# Patient Record
Sex: MALE | Race: WHITE | NOT HISPANIC OR LATINO | Employment: FULL TIME | URBAN - METROPOLITAN AREA
[De-identification: names, ages, dates, MRNs, and addresses within clinical notes are randomized per-mention and may not be internally consistent; named-entity substitution may affect disease eponyms.]

---

## 2017-01-03 ENCOUNTER — ANESTHESIA EVENT (OUTPATIENT)
Dept: SURGERY | Facility: HOSPITAL | Age: 42
DRG: 505 | End: 2017-01-03
Payer: OTHER MISCELLANEOUS

## 2017-01-03 ENCOUNTER — TELEPHONE (OUTPATIENT)
Dept: ORTHOPEDICS | Facility: CLINIC | Age: 42
End: 2017-01-03

## 2017-01-03 NOTE — TELEPHONE ENCOUNTER
Spoke with Des with [employer]. Des stated that nurse  for patient's case is requesting a handheld shower, and bedside commode. Will discuss with Dr. Washburn and fax the prescriptions to her.

## 2017-01-03 NOTE — TELEPHONE ENCOUNTER
----- Message from Morelia Nevarez sent at 1/3/2017  2:46 PM CST -----  Contact: Des - [employer]  Des with [employer] called in regards to Kiel and some medications that he will be needing prior to his surgery tomorrow. Please call her back at [phone]

## 2017-01-04 ENCOUNTER — ANESTHESIA (OUTPATIENT)
Dept: SURGERY | Facility: HOSPITAL | Age: 42
DRG: 505 | End: 2017-01-04
Payer: OTHER MISCELLANEOUS

## 2017-01-04 ENCOUNTER — SURGERY (OUTPATIENT)
Age: 42
End: 2017-01-04

## 2017-01-04 PROBLEM — S92.002A BILATERAL CALCANEAL FRACTURES: Status: ACTIVE | Noted: 2017-01-04

## 2017-01-04 PROBLEM — S92.001A BILATERAL CALCANEAL FRACTURES: Status: ACTIVE | Noted: 2017-01-04

## 2017-01-04 PROCEDURE — 76942 ECHO GUIDE FOR BIOPSY: CPT | Mod: 26,,, | Performed by: ANESTHESIOLOGY

## 2017-01-04 PROCEDURE — 25000003 PHARM REV CODE 250: Performed by: ANESTHESIOLOGY

## 2017-01-04 PROCEDURE — 63600175 PHARM REV CODE 636 W HCPCS: Performed by: NURSE ANESTHETIST, CERTIFIED REGISTERED

## 2017-01-04 PROCEDURE — 27200651 HC AIRWAY, LMA: Performed by: NURSE ANESTHETIST, CERTIFIED REGISTERED

## 2017-01-04 PROCEDURE — D9220A PRA ANESTHESIA: Mod: ,,, | Performed by: ANESTHESIOLOGY

## 2017-01-04 PROCEDURE — 76942 ECHO GUIDE FOR BIOPSY: CPT | Performed by: ANESTHESIOLOGY

## 2017-01-04 PROCEDURE — 27200664 HC NERVE BLOCK COMPLETE KIT: Performed by: ANESTHESIOLOGY

## 2017-01-04 PROCEDURE — 64446 NJX AA&/STRD SC NRV NFS IMG: CPT | Performed by: ANESTHESIOLOGY

## 2017-01-04 PROCEDURE — 64450 NJX AA&/STRD OTHER PN/BRANCH: CPT | Mod: 59,LT,, | Performed by: ANESTHESIOLOGY

## 2017-01-04 PROCEDURE — 63600175 PHARM REV CODE 636 W HCPCS: Performed by: ORTHOPAEDIC SURGERY

## 2017-01-04 PROCEDURE — 25000003 PHARM REV CODE 250: Performed by: NURSE ANESTHETIST, CERTIFIED REGISTERED

## 2017-01-04 RX ORDER — HYDROMORPHONE HYDROCHLORIDE 2 MG/ML
INJECTION, SOLUTION INTRAMUSCULAR; INTRAVENOUS; SUBCUTANEOUS
Status: DISCONTINUED | OUTPATIENT
Start: 2017-01-04 | End: 2017-01-04

## 2017-01-04 RX ORDER — METOPROLOL TARTRATE 1 MG/ML
INJECTION, SOLUTION INTRAVENOUS
Status: DISCONTINUED | OUTPATIENT
Start: 2017-01-04 | End: 2017-01-04

## 2017-01-04 RX ORDER — ACETAMINOPHEN 10 MG/ML
INJECTION, SOLUTION INTRAVENOUS
Status: DISCONTINUED | OUTPATIENT
Start: 2017-01-04 | End: 2017-01-04

## 2017-01-04 RX ORDER — FENTANYL CITRATE 50 UG/ML
INJECTION, SOLUTION INTRAMUSCULAR; INTRAVENOUS
Status: DISCONTINUED | OUTPATIENT
Start: 2017-01-04 | End: 2017-01-04

## 2017-01-04 RX ORDER — KETAMINE HYDROCHLORIDE 100 MG/ML
INJECTION, SOLUTION INTRAMUSCULAR; INTRAVENOUS
Status: DISCONTINUED | OUTPATIENT
Start: 2017-01-04 | End: 2017-01-04

## 2017-01-04 RX ORDER — DEXAMETHASONE SODIUM PHOSPHATE 4 MG/ML
INJECTION, SOLUTION INTRA-ARTICULAR; INTRALESIONAL; INTRAMUSCULAR; INTRAVENOUS; SOFT TISSUE
Status: DISCONTINUED | OUTPATIENT
Start: 2017-01-04 | End: 2017-01-04

## 2017-01-04 RX ORDER — PROPOFOL 10 MG/ML
VIAL (ML) INTRAVENOUS
Status: DISCONTINUED | OUTPATIENT
Start: 2017-01-04 | End: 2017-01-04

## 2017-01-04 RX ORDER — LIDOCAINE HCL/PF 100 MG/5ML
SYRINGE (ML) INTRAVENOUS
Status: DISCONTINUED | OUTPATIENT
Start: 2017-01-04 | End: 2017-01-04

## 2017-01-04 RX ORDER — KETOROLAC TROMETHAMINE 30 MG/ML
INJECTION, SOLUTION INTRAMUSCULAR; INTRAVENOUS
Status: DISCONTINUED | OUTPATIENT
Start: 2017-01-04 | End: 2017-01-04

## 2017-01-04 RX ORDER — MIDAZOLAM HYDROCHLORIDE 1 MG/ML
INJECTION, SOLUTION INTRAMUSCULAR; INTRAVENOUS
Status: DISCONTINUED | OUTPATIENT
Start: 2017-01-04 | End: 2017-01-04

## 2017-01-04 RX ORDER — ONDANSETRON HYDROCHLORIDE 2 MG/ML
INJECTION, SOLUTION INTRAMUSCULAR; INTRAVENOUS
Status: DISCONTINUED | OUTPATIENT
Start: 2017-01-04 | End: 2017-01-04

## 2017-01-04 RX ADMIN — KETOROLAC TROMETHAMINE 30 MG: 30 INJECTION, SOLUTION INTRAMUSCULAR; INTRAVENOUS at 10:01

## 2017-01-04 RX ADMIN — SODIUM CHLORIDE, SODIUM LACTATE, POTASSIUM CHLORIDE, AND CALCIUM CHLORIDE: .6; .31; .03; .02 INJECTION, SOLUTION INTRAVENOUS at 11:01

## 2017-01-04 RX ADMIN — ONDANSETRON 4 MG: 2 INJECTION, SOLUTION INTRAMUSCULAR; INTRAVENOUS at 10:01

## 2017-01-04 RX ADMIN — CEFAZOLIN SODIUM 2 G: 2 SOLUTION INTRAVENOUS at 10:01

## 2017-01-04 RX ADMIN — FENTANYL CITRATE 50 MCG: 50 INJECTION INTRAMUSCULAR; INTRAVENOUS at 10:01

## 2017-01-04 RX ADMIN — MUPIROCIN 22 TUBE: 20 OINTMENT TOPICAL at 11:01

## 2017-01-04 RX ADMIN — HYDROMORPHONE HYDROCHLORIDE 1 MG: 2 INJECTION INTRAMUSCULAR; INTRAVENOUS; SUBCUTANEOUS at 10:01

## 2017-01-04 RX ADMIN — DEXAMETHASONE SODIUM PHOSPHATE 4 MG: 4 INJECTION, SOLUTION INTRAMUSCULAR; INTRAVENOUS at 10:01

## 2017-01-04 RX ADMIN — CEFAZOLIN SODIUM 1 G: 2 SOLUTION INTRAVENOUS at 12:01

## 2017-01-04 RX ADMIN — ACETAMINOPHEN 1000 MG: 10 INJECTION, SOLUTION INTRAVENOUS at 10:01

## 2017-01-04 RX ADMIN — SODIUM CHLORIDE, SODIUM LACTATE, POTASSIUM CHLORIDE, AND CALCIUM CHLORIDE: .6; .31; .03; .02 INJECTION, SOLUTION INTRAVENOUS at 02:01

## 2017-01-04 RX ADMIN — PROPOFOL 200 MG: 10 INJECTION, EMULSION INTRAVENOUS at 10:01

## 2017-01-04 RX ADMIN — LIDOCAINE HYDROCHLORIDE 100 MG: 20 INJECTION, SOLUTION INTRAVENOUS at 10:01

## 2017-01-04 RX ADMIN — HYDROMORPHONE HYDROCHLORIDE 0.5 MG: 2 INJECTION INTRAMUSCULAR; INTRAVENOUS; SUBCUTANEOUS at 12:01

## 2017-01-04 RX ADMIN — KETAMINE HYDROCHLORIDE 30 MG: 100 INJECTION, SOLUTION, CONCENTRATE INTRAMUSCULAR; INTRAVENOUS at 10:01

## 2017-01-04 RX ADMIN — METOPROLOL TARTRATE 2.5 MG: 1 INJECTION, SOLUTION INTRAVENOUS at 11:01

## 2017-01-04 RX ADMIN — MIDAZOLAM 2 MG: 1 INJECTION INTRAMUSCULAR; INTRAVENOUS at 10:01

## 2017-01-04 RX ADMIN — ESMOLOL HYDROCHLORIDE 30 MG: 20 INJECTION INTRAVENOUS at 10:01

## 2017-01-04 RX ADMIN — METOPROLOL TARTRATE 2.5 MG: 1 INJECTION, SOLUTION INTRAVENOUS at 12:01

## 2017-01-04 NOTE — ANESTHESIA PROCEDURE NOTES
Peripheral    Patient location during procedure: post-op   Block not for primary anesthetic.  Reason for block: at surgeon's request and post-op pain management   Post-op Pain Location: foot/calcaneous right  Start time: 1/4/2017 2:45 PM  Timeout: 1/4/2017 2:31 PM   End time: 1/4/2017 2:59 PM  Surgery related to: ORIF calcaneal fracture right  Staffing  Anesthesiologist: LARA VALADEZ  Performed by: anesthesiologist   Preanesthetic Checklist  Completed: patient identified, site marked, surgical consent, pre-op evaluation, timeout performed, IV checked, risks and benefits discussed and monitors and equipment checked  Peripheral Block  Patient position: supine  Prep: ChloraPrep and site prepped and draped  Patient monitoring: cardiac monitor, continuous pulse ox and frequent blood pressure checks  Block type: popliteal  Laterality: right  Injection technique: continuous  Needle  Needle type: Tuohy   Needle length: 4 in  Needle localization: ultrasound guidance  Catheter type: non-stimulating  Catheter size: 20 G  Test dose: lidocaine 1.5% with Epi 1-to-200,000 and negative   -ultrasound image captured on disc.  Assessment  Injection assessment: negative aspiration, negative parasthesia and local visualized surrounding nerve  Paresthesia pain: none  Heart rate change: no  Slow fractionated injection: yes  Medications:  Bolus administered: 20 mL of 0.5 and 2.0 ropivacaine and lidocaine  Epinephrine added: none  Additional Notes  Ropiv. 0.5% with equal volume lido 2%

## 2017-01-04 NOTE — ANESTHESIA PREPROCEDURE EVALUATION
01/04/2017  Kiel Ayala is a 41 y.o., male.    [facility] Anesthesia Evaluation    I have reviewed the Patient Summary Reports.    I have reviewed the Nursing Notes.      Review of Systems  Anesthesia Hx:  No problems with previous Anesthesia Denies Hx of Anesthetic complications    Social:  Non-Smoker    Hematology/Oncology:  Hematology Normal   Oncology Normal     EENT/Dental:EENT/Dental Normal   Cardiovascular:  Cardiovascular Normal     Pulmonary:  Pulmonary Normal    Renal/:  Renal/ Normal     Hepatic/GI:  Hepatic/GI Normal    Neurological:  Neurology Normal    Endocrine:  Endocrine Normal        Physical Exam  General:  Well nourished    Airway/Jaw/Neck:  Airway Findings: Mouth Opening: Normal Tongue: Normal  General Airway Assessment: Adult  Mallampati: II  Improves to I with phonation.  TM Distance: Normal, at least 6 cm  Jaw/Neck Findings:  Neck ROM: Normal ROM      Dental:  Dental Findings: In tact   Chest/Lungs:  Chest/Lungs Clear    Heart/Vascular:  Heart Findings: Normal            Anesthesia Plan  Type of Anesthesia, risks & benefits discussed:  Anesthesia Type:  general  Patient's Preference:   Intra-op Monitoring Plan:   Intra-op Monitoring Plan Comments:   Post Op Pain Control Plan:   Post Op Pain Control Plan Comments:   Induction:   IV  Beta Blocker:  Patient is not currently on a Beta-Blocker (No further documentation required).       Informed Consent: Patient understands risks and agrees with Anesthesia plan.  Questions answered. Anesthesia consent signed with patient.  ASA Score: 1     Day of Surgery Review of History & Physical:    H&P update referred to the surgeon.         Ready For Surgery From Anesthesia Perspective.

## 2017-01-04 NOTE — ANESTHESIA PROCEDURE NOTES
Peripheral    Patient location during procedure: post-op   Block not for primary anesthetic.  Reason for block: at surgeon's request and post-op pain management   Post-op Pain Location: foot/calcaneous left  Start time: 1/4/2017 2:31 PM  Timeout: 1/4/2017 2:31 PM   End time: 1/4/2017 2:44 PM  Surgery related to: ORIF calcaneal Fracture  Staffing  Anesthesiologist: LARA VALADEZ  Performed by: anesthesiologist   Preanesthetic Checklist  Completed: patient identified, site marked, surgical consent, pre-op evaluation, timeout performed, IV checked, risks and benefits discussed and monitors and equipment checked  Peripheral Block  Patient position: supine  Prep: ChloraPrep and site prepped and draped  Patient monitoring: heart rate, cardiac monitor, frequent blood pressure checks and continuous pulse ox  Block type: popliteal  Laterality: left  Injection technique: continuous  Needle  Needle type: Tuohy   Needle gauge: 18 G  Needle length: 4 in  Needle localization: ultrasound guidance  Catheter type: non-stimulating  Catheter size: 20 G  Test dose: lidocaine 1.5% with Epi 1-to-200,000 and negative   -ultrasound image captured on disc.  Assessment  Injection assessment: negative aspiration, negative parasthesia and local visualized surrounding nerve  Paresthesia pain: none  Heart rate change: no  Slow fractionated injection: yes  Medications:  Bolus administered: 20 mL of 0.5 and 2.0 ropivacaine and lidocaine  Epinephrine added: none

## 2017-01-04 NOTE — TRANSFER OF CARE
"Anesthesia Transfer of Care Note    Patient: Kiel GARCIA Rancatore    Procedure(s) Performed: Procedure(s):  OPEN REDUCTION INTERNAL FIXATION- CALCANEOUS (bilateral)    Patient location: PACU    Anesthesia Type: general    Transport from OR: Transported from OR on 2-3 L/min O2 by NC with adequate spontaneous ventilation    Post pain: adequate analgesia    Post assessment: no apparent anesthetic complications    Post vital signs: stable    Level of consciousness: awake and oriented    Nausea/Vomiting: no nausea/vomiting    Complications: none          Last vitals:   Visit Vitals    /71 (BP Location: Left arm, Patient Position: Lying, BP Method: Automatic)    Pulse 95    Temp 36.4 °C (97.6 °F) (Oral)    Resp 18    Ht 5' 5" (1.651 m)    Wt 72.6 kg (160 lb)    SpO2 100%    BMI 26.63 kg/m2     "

## 2017-01-04 NOTE — ANESTHESIA POSTPROCEDURE EVALUATION
"Anesthesia Post Evaluation    Patient: Kiel Dominguezcatore    Procedure(s) Performed: Procedure(s):  OPEN REDUCTION INTERNAL FIXATION- CALCANEOUS (bilateral)    Final Anesthesia Type: general  Patient location during evaluation: PACU  Patient participation: Yes- Able to Participate  Level of consciousness: awake and alert  Post-procedure vital signs: reviewed and stable  Pain management: adequate  Airway patency: patent  PONV status at discharge: No PONV  Anesthetic complications: no      Cardiovascular status: blood pressure returned to baseline  Respiratory status: unassisted  Hydration status: euvolemic  Follow-up not needed.        Visit Vitals    /82 (BP Location: Left arm, Patient Position: Lying, BP Method: Automatic)    Pulse 100    Temp 36.4 °C (97.6 °F) (Oral)    Resp 14    Ht 5' 5" (1.651 m)    Wt 72.6 kg (160 lb)    SpO2 98%    BMI 26.63 kg/m2       Pain/Salvador Score: Pain Assessment Performed: Yes (1/4/2017  3:15 PM)  Presence of Pain: complains of pain/discomfort (1/4/2017  3:15 PM)  Pain Rating Prior to Med Admin: 6 (1/4/2017  3:15 PM)  Salvador Score: 9 (1/4/2017  3:15 PM)      "

## 2017-01-05 ENCOUNTER — ANESTHESIA (OUTPATIENT)
Dept: MEDSURG UNIT | Facility: HOSPITAL | Age: 42
DRG: 505 | End: 2017-01-05
Payer: OTHER MISCELLANEOUS

## 2017-01-05 PROCEDURE — 27200750 HC INSULATED NEEDLE/ STIMUPLEX: Performed by: ANESTHESIOLOGY

## 2017-01-05 PROCEDURE — 64445 NJX AA&/STRD SCIATIC NRV IMG: CPT | Performed by: ANESTHESIOLOGY

## 2017-01-05 NOTE — ADDENDUM NOTE
Addendum  created 01/05/17 1119 by Adam Luna II, MD    Charge Capture section accepted, Sign clinical note

## 2017-01-06 RX ORDER — MEPERIDINE HYDROCHLORIDE 50 MG/1
50 TABLET ORAL EVERY 4 HOURS PRN
Qty: 83 TABLET | Refills: 0 | Status: SHIPPED | OUTPATIENT
Start: 2017-01-06 | End: 2017-11-02

## 2017-01-06 NOTE — ADDENDUM NOTE
Addendum  created 01/06/17 1619 by Tyler Ocampo MD    Anesthesia Attestations filed, Anesthesia Intra Blocks edited

## 2017-01-06 NOTE — TELEPHONE ENCOUNTER
Spoke with Micaela Magallon pt , she stated magalie does not have the demerol and it needs to be sent to another pharmacy.Informed her I will call another pharmacy to see who has script and will be in touch with her aagin, she verbalized understanding.

## 2017-01-06 NOTE — ADDENDUM NOTE
Addendum  created 01/06/17 1459 by Dinorah Platt MD    Anesthesia Event edited, Procedure Event Log accessed, Sign clinical note

## 2017-01-06 NOTE — ANESTHESIA PROCEDURE NOTES
Peripheral    Patient location during procedure: floor   Block not for primary anesthetic.  Reason for block: at surgeon's request and post-op pain management   Post-op Pain Location: BILATERAL CALCANEOUS FRACTURES  Start time: 1/5/2017 6:45 PM  Timeout: 1/5/2017 6:45 PM   End time: 1/5/2017 6:55 PM  Surgery related to: OPEN REDUCTION INTERNAL FIXATION- CALCANEOUS (bilateral) (Bilateral Foot  Staffing  Anesthesiologist: IBRAHIMA DAI  Performed by: anesthesiologist   Preanesthetic Checklist  Completed: patient identified, site marked, surgical consent, pre-op evaluation, timeout performed, IV checked, risks and benefits discussed and monitors and equipment checked  Peripheral Block  Patient position: supine  Prep: ChloraPrep  Patient monitoring: heart rate  Block type: popliteal  Laterality: right  Injection technique: single shot  Needle  Needle type: Stimuplex   Needle gauge: 21 G  Needle length: 4 in  Needle localization: paresthesias and ultrasound guidance  Needle insertion depth: 5 cm  Test dose: negative     Assessment  Injection assessment: negative aspiration, negative parasthesia and local visualized surrounding nerve  Paresthesia pain: none  Heart rate change: no  Slow fractionated injection: yes  Medications:  Bolus administered: 30 mL of 0.25 bupivacaine  Epinephrine added: 5 mcg/mL (1/200,000)

## 2017-01-06 NOTE — TELEPHONE ENCOUNTER
Spoke with pt  Micaela Magallon, informed her the [facility] in [place] [facility] the script and it will be cancelled at [facility] and resent to the medicine shop[facility] in [place]. She stated that was fine.

## 2017-01-07 ENCOUNTER — NURSE TRIAGE (OUTPATIENT)
Dept: ADMINISTRATIVE | Facility: CLINIC | Age: 42
End: 2017-01-07

## 2017-01-07 NOTE — TELEPHONE ENCOUNTER
"    Reason for Disposition   Sounds like a serious complication to the triager    Answer Assessment - Initial Assessment Questions  1. SYMPTOM: "What's the main symptom you're concerned about?" (e.g., pain, fever, vomiting)      Pain to both feet  2. ONSET: "When did ________  start?"      today  3. SURGERY: "What surgery was performed?"      Repair fracture to bilatteral heels  4. DATE of SURGERY: "When was surgery performed?"       1/2017  5. ANESTHESIA: " What type of anesthesia did you have?" (e.g., general, spinal, epidural, local)      general  6. PAIN: "Is there any pain?" If so, ask: "How bad is it?"  (Scale 1-10; or mild, moderate, severe)      10/10  7. FEVER: "Do you have a fever?" If so, ask: "What is your temperature, how was it measured, and when did it start?"      no  8. VOMITING: "Is there any vomiting?" If yes, ask: "How many times?"      no  9. BLEEDING: "Is there any bleeding?" If so, ask: "How much?" and "Where?"      no  10. OTHER SYMPTOMS: "Do you have any other symptoms?" (e.g., drainage from wound, painful urination, constipation)        Demerol not holding pain    Protocols used: ST POST-OP SYMPTOMS AND [id number]    Patient experiencing pain to bilatteral feet post-op bilatteral hell fracture repair.  Patient reported Demerol 50mg Q 4 hours is not working.  Referred patient to the ED for eval and treatment.    "

## 2017-01-09 ENCOUNTER — TELEPHONE (OUTPATIENT)
Dept: ORTHOPEDICS | Facility: CLINIC | Age: 42
End: 2017-01-09

## 2017-01-09 NOTE — TELEPHONE ENCOUNTER
----- Message from Stacey M Lefort sent at 1/9/2017  2:02 PM CST -----  Contact: Des [employer] - [phone]  Des would like a call back in regard to Mr. Ayala. He is in extreme pain and they are trying to avoid having him go to an er b/c of the type of injury that he has.  Mr. Ayala's  has reached out to Des in order to help him. Please call Des at [phone]. Thank you.

## 2017-01-09 NOTE — TELEPHONE ENCOUNTER
----- Message from Morelia Nevarez sent at 1/9/2017  9:20 AM CST -----  Contact: Self - [phone]  Patient called stating the medication he was prescribed is not working. Please call at [phone]

## 2017-01-09 NOTE — TELEPHONE ENCOUNTER
Spoke to Des with [employer] and advised that I will contact Dr. Washburn. Spoke with Dr. Washburn regarding patient's pain to be 10/10 in bilateral feet. Dr. Washburn advised that patient can take 1.5 tablets of Demerol 50mg [id number] prn with phenergan as ordered. Advised Des of new orders. Spoke to patient and advised of new orders. Advised patient that he should attempt to go back down to 1 tablet every 4 hours once pain is controlled. Also, advised that if the 1.5 tabs does not help with pain then he should proceed to the ER. Patient stated understanding.

## 2017-01-13 DIAGNOSIS — M79.672 BILATERAL FOOT PAIN: Primary | ICD-10-CM

## 2017-01-13 DIAGNOSIS — M79.671 BILATERAL FOOT PAIN: Primary | ICD-10-CM

## 2017-01-13 RX ORDER — OXYCODONE AND ACETAMINOPHEN 10; 325 MG/1; MG/1
1-2 TABLET ORAL EVERY 4 HOURS PRN
Qty: 85 TABLET | Refills: 0 | Status: SHIPPED | OUTPATIENT
Start: 2017-01-13 | End: 2017-01-19

## 2017-01-13 NOTE — TELEPHONE ENCOUNTER
Patient will be out of pain medication tomorrow morning. Requesting to have refill on Percocet instead of Demerol.

## 2017-01-16 RX ORDER — OXYCODONE AND ACETAMINOPHEN 10; 325 MG/1; MG/1
1-2 TABLET ORAL EVERY 4 HOURS PRN
Qty: 85 TABLET | Refills: 0 | OUTPATIENT
Start: 2017-01-16

## 2017-01-19 ENCOUNTER — HOSPITAL ENCOUNTER (OUTPATIENT)
Dept: RADIOLOGY | Facility: HOSPITAL | Age: 42
Discharge: HOME | End: 2017-01-19
Attending: ORTHOPAEDIC SURGERY
Payer: OTHER MISCELLANEOUS

## 2017-01-19 ENCOUNTER — OFFICE VISIT (OUTPATIENT)
Dept: ORTHOPEDICS | Facility: CLINIC | Age: 42
End: 2017-01-19
Payer: OTHER MISCELLANEOUS

## 2017-01-19 VITALS
WEIGHT: 160 LBS | BODY MASS INDEX: 26.66 KG/M2 | SYSTOLIC BLOOD PRESSURE: 127 MMHG | HEIGHT: 65 IN | HEART RATE: 115 BPM | DIASTOLIC BLOOD PRESSURE: 74 MMHG

## 2017-01-19 DIAGNOSIS — M79.671 BILATERAL FOOT PAIN: ICD-10-CM

## 2017-01-19 DIAGNOSIS — S92.001D BILATERAL CALCANEAL FRACTURES, WITH ROUTINE HEALING, SUBSEQUENT ENCOUNTER: Primary | ICD-10-CM

## 2017-01-19 DIAGNOSIS — S92.002D BILATERAL CALCANEAL FRACTURES, WITH ROUTINE HEALING, SUBSEQUENT ENCOUNTER: Primary | ICD-10-CM

## 2017-01-19 DIAGNOSIS — M79.672 BILATERAL FOOT PAIN: ICD-10-CM

## 2017-01-19 PROCEDURE — 73650 X-RAY EXAM OF HEEL: CPT | Mod: 26,50,, | Performed by: RADIOLOGY

## 2017-01-19 PROCEDURE — 73650 X-RAY EXAM OF HEEL: CPT | Mod: 50,TC,PN

## 2017-01-19 PROCEDURE — 29405 APPL SHORT LEG CAST: CPT | Mod: S$GLB,,, | Performed by: ORTHOPAEDIC SURGERY

## 2017-01-19 PROCEDURE — 99024 POSTOP FOLLOW-UP VISIT: CPT | Mod: S$GLB,,, | Performed by: ORTHOPAEDIC SURGERY

## 2017-01-19 PROCEDURE — 99999 PR PBB SHADOW E&M-EST. PATIENT-LVL III: CPT | Mod: PBBFAC,,, | Performed by: ORTHOPAEDIC SURGERY

## 2017-01-19 RX ORDER — CYCLOBENZAPRINE HCL 10 MG
10 TABLET ORAL 3 TIMES DAILY PRN
Qty: 60 TABLET | Refills: 1 | Status: SHIPPED | OUTPATIENT
Start: 2017-01-19 | End: 2017-01-29

## 2017-01-19 RX ORDER — OXYCODONE AND ACETAMINOPHEN 10; 325 MG/1; MG/1
1 TABLET ORAL EVERY 4 HOURS PRN
Qty: 84 TABLET | Refills: 0 | Status: SHIPPED | OUTPATIENT
Start: 2017-01-19 | End: 2017-02-02 | Stop reason: SDUPTHER

## 2017-01-19 RX ORDER — CLINDAMYCIN HYDROCHLORIDE 300 MG/1
300 CAPSULE ORAL EVERY 8 HOURS
Qty: 30 CAPSULE | Refills: 0 | Status: SHIPPED | OUTPATIENT
Start: 2017-01-19 | End: 2017-01-29

## 2017-01-19 NOTE — PROGRESS NOTES
Subjective:      Patient ID: Kiel Ayala is a 41 y.o. male.    Chief Complaint: Post-op Evaluation of the Left Foot (DOS: 1-4-17/ORIF calcaneus ) and Post-op Evaluation of the Right Foot (DOS: 1-4-17/ORIF calcaneus )    HPI: Doing fairly well today s/p open reduction internal fixation  Bilateral calcaneus fractures. He rates his pain as 6/10 today.   Social History     Occupational History    Not on file.     Social History Main Topics    Smoking status: Never Smoker    Smokeless tobacco: Never Used    Alcohol use Yes      Comment: rarely    Drug use: No    Sexual activity: Not on file            Objective:    Ortho Exam     LLE: Neurovascularly intact, incisions well healed, moderate swelling, sutures are intact.  No signs of infection.      RLE: Neurovascularly intact, incisions well healed, moderate swelling, sutures are intact.  No signs of infection. He has some superficial skin necrosis of the flap.   Assessment:       1. Bilateral calcaneal fractures, with routine healing, subsequent encounter          Plan:       Bilateral short leg casts applied. Non-weightbearing. I started him on clindamycin to help prevent infection. I also refilled his pain medication and gave him flexeril for muscle spasms. F/u 2 weeks with xray of the bilateral heels.

## 2017-01-27 DIAGNOSIS — S92.001A BILATERAL CALCANEAL FRACTURES, CLOSED, INITIAL ENCOUNTER: Primary | ICD-10-CM

## 2017-01-27 DIAGNOSIS — S92.002A BILATERAL CALCANEAL FRACTURES, CLOSED, INITIAL ENCOUNTER: Primary | ICD-10-CM

## 2017-02-02 ENCOUNTER — HOSPITAL ENCOUNTER (OUTPATIENT)
Dept: RADIOLOGY | Facility: HOSPITAL | Age: 42
Discharge: HOME | End: 2017-02-02
Attending: ORTHOPAEDIC SURGERY
Payer: OTHER MISCELLANEOUS

## 2017-02-02 ENCOUNTER — OFFICE VISIT (OUTPATIENT)
Dept: ORTHOPEDICS | Facility: CLINIC | Age: 42
End: 2017-02-02
Payer: OTHER MISCELLANEOUS

## 2017-02-02 VITALS
HEIGHT: 65 IN | WEIGHT: 160 LBS | BODY MASS INDEX: 26.66 KG/M2 | SYSTOLIC BLOOD PRESSURE: 119 MMHG | DIASTOLIC BLOOD PRESSURE: 71 MMHG | HEART RATE: 114 BPM

## 2017-02-02 DIAGNOSIS — S92.002D BILATERAL CALCANEAL FRACTURES, WITH ROUTINE HEALING, SUBSEQUENT ENCOUNTER: Primary | ICD-10-CM

## 2017-02-02 DIAGNOSIS — S92.001A BILATERAL CALCANEAL FRACTURES, CLOSED, INITIAL ENCOUNTER: ICD-10-CM

## 2017-02-02 DIAGNOSIS — S92.001D BILATERAL CALCANEAL FRACTURES, WITH ROUTINE HEALING, SUBSEQUENT ENCOUNTER: Primary | ICD-10-CM

## 2017-02-02 DIAGNOSIS — S92.002A BILATERAL CALCANEAL FRACTURES, CLOSED, INITIAL ENCOUNTER: ICD-10-CM

## 2017-02-02 PROCEDURE — 73650 X-RAY EXAM OF HEEL: CPT | Mod: 26,50,, | Performed by: RADIOLOGY

## 2017-02-02 PROCEDURE — 73650 X-RAY EXAM OF HEEL: CPT | Mod: 50,TC,PN

## 2017-02-02 PROCEDURE — 99999 PR PBB SHADOW E&M-EST. PATIENT-LVL III: CPT | Mod: PBBFAC,,, | Performed by: ORTHOPAEDIC SURGERY

## 2017-02-02 PROCEDURE — 29405 APPL SHORT LEG CAST: CPT | Mod: 50,S$GLB,, | Performed by: ORTHOPAEDIC SURGERY

## 2017-02-02 PROCEDURE — 99024 POSTOP FOLLOW-UP VISIT: CPT | Mod: S$GLB,,, | Performed by: ORTHOPAEDIC SURGERY

## 2017-02-02 RX ORDER — OXYCODONE AND ACETAMINOPHEN 10; 325 MG/1; MG/1
1 TABLET ORAL EVERY 4 HOURS PRN
Qty: 84 TABLET | Refills: 0 | Status: SHIPPED | OUTPATIENT
Start: 2017-02-02 | End: 2017-02-16 | Stop reason: SDUPTHER

## 2017-02-02 RX ORDER — PROMETHAZINE HYDROCHLORIDE 25 MG/1
25 TABLET ORAL EVERY 4 HOURS PRN
Qty: 83 TABLET | Refills: 0 | Status: SHIPPED | OUTPATIENT
Start: 2017-02-02 | End: 2017-03-08 | Stop reason: SDUPTHER

## 2017-02-02 NOTE — PROGRESS NOTES
Subjective:      Patient ID: Kiel Ayala is a 41 y.o. male.    Chief Complaint: Post-op Evaluation of the Left Foot (DOS: 1-4-17/ORIF calc ) and Post-op Evaluation of the Right Foot (1-4-17/ORIF calc )    HPI: Doing fairly well today s/p open reduction internal fixation Bilateral calcaneus fractures. He rates his pain as 6/10 today. He is doing better today.   Social History     Occupational History    Not on file.     Social History Main Topics    Smoking status: Never Smoker    Smokeless tobacco: Never Used    Alcohol use Yes      Comment: rarely    Drug use: No    Sexual activity: Not on file            Objective:    Ortho Exam     LLE: Neurovascularly intact, incisions well healed, mild swelling, sutures are intact.  No signs of infection. Mild  tenderness to palpation at the the fracture sites.       RLE: Neurovascularly intact, incisions well healed, moderate swelling, sutures are intact.  No signs of infection. He has some superficial skin necrosis of the flap.   Assessment:           Plan:       Bilateral short leg casts applied. Non-weightbearing. I started him on clindamycin to help prevent infection. I also refilled his pain medication and gave him flexeril for muscle spasms. F/u 2 weeks with xray of the bilateral heels.

## 2017-02-08 DIAGNOSIS — M79.672 BILATERAL FOOT PAIN: Primary | ICD-10-CM

## 2017-02-08 DIAGNOSIS — M79.671 BILATERAL FOOT PAIN: Primary | ICD-10-CM

## 2017-02-16 ENCOUNTER — OFFICE VISIT (OUTPATIENT)
Dept: ORTHOPEDICS | Facility: CLINIC | Age: 42
End: 2017-02-16
Payer: OTHER MISCELLANEOUS

## 2017-02-16 ENCOUNTER — HOSPITAL ENCOUNTER (OUTPATIENT)
Dept: RADIOLOGY | Facility: HOSPITAL | Age: 42
Discharge: HOME | End: 2017-02-16
Attending: ORTHOPAEDIC SURGERY
Payer: OTHER MISCELLANEOUS

## 2017-02-16 VITALS
SYSTOLIC BLOOD PRESSURE: 117 MMHG | DIASTOLIC BLOOD PRESSURE: 79 MMHG | BODY MASS INDEX: 26.66 KG/M2 | HEART RATE: 104 BPM | WEIGHT: 160 LBS | HEIGHT: 65 IN

## 2017-02-16 DIAGNOSIS — M79.671 BILATERAL FOOT PAIN: ICD-10-CM

## 2017-02-16 DIAGNOSIS — S92.001D BILATERAL CALCANEAL FRACTURES, WITH ROUTINE HEALING, SUBSEQUENT ENCOUNTER: Primary | ICD-10-CM

## 2017-02-16 DIAGNOSIS — M79.672 BILATERAL FOOT PAIN: ICD-10-CM

## 2017-02-16 DIAGNOSIS — S92.002D BILATERAL CALCANEAL FRACTURES, WITH ROUTINE HEALING, SUBSEQUENT ENCOUNTER: Primary | ICD-10-CM

## 2017-02-16 PROCEDURE — 73650 X-RAY EXAM OF HEEL: CPT | Mod: 26,50,, | Performed by: RADIOLOGY

## 2017-02-16 PROCEDURE — 99024 POSTOP FOLLOW-UP VISIT: CPT | Mod: S$GLB,,, | Performed by: ORTHOPAEDIC SURGERY

## 2017-02-16 PROCEDURE — 97760 ORTHOTIC MGMT&TRAING 1ST ENC: CPT | Mod: S$GLB,,, | Performed by: ORTHOPAEDIC SURGERY

## 2017-02-16 PROCEDURE — 73650 X-RAY EXAM OF HEEL: CPT | Mod: 50,TC,PN

## 2017-02-16 PROCEDURE — 99999 PR PBB SHADOW E&M-EST. PATIENT-LVL III: CPT | Mod: PBBFAC,,, | Performed by: ORTHOPAEDIC SURGERY

## 2017-02-16 RX ORDER — OXYCODONE AND ACETAMINOPHEN 10; 325 MG/1; MG/1
1 TABLET ORAL EVERY 4 HOURS PRN
Qty: 84 TABLET | Refills: 0 | Status: SHIPPED | OUTPATIENT
Start: 2017-02-16 | End: 2017-03-08 | Stop reason: SDUPTHER

## 2017-02-16 RX ORDER — GABAPENTIN 300 MG/1
300 CAPSULE ORAL 2 TIMES DAILY
Qty: 60 CAPSULE | Refills: 1 | Status: SHIPPED | OUTPATIENT
Start: 2017-02-16 | End: 2017-03-27 | Stop reason: SDUPTHER

## 2017-02-16 RX ORDER — CYCLOBENZAPRINE HCL 10 MG
10 TABLET ORAL 3 TIMES DAILY PRN
Qty: 60 TABLET | Refills: 0 | Status: SHIPPED | OUTPATIENT
Start: 2017-02-16 | End: 2017-02-26

## 2017-02-16 NOTE — PROGRESS NOTES
Subjective:      Patient ID: Kiel Ayala is a 41 y.o. male.    Chief Complaint: Post-op Evaluation of the Right Foot; Post-op Evaluation of the Left Foot; and Post-op Evaluation (s/p bilateral ORIF calcaneus 1/4/17)    HPI: Doing fairly well today s/p open reduction internal fixation Bilateral calcaneus fractures. He rates his pain as 5/10 today. He is doing better today.   Social History     Occupational History    Not on file.     Social History Main Topics    Smoking status: Never Smoker    Smokeless tobacco: Never Used    Alcohol use Yes      Comment: rarely    Drug use: No    Sexual activity: Not on file            Objective:    Ortho Exam     LLE: Neurovascularly intact, incisions well healed, minimal swelling, sutures are intact.  No signs of infection. Minimal tenderness to palpation at the the fracture sites.       RLE: Neurovascularly intact, incisions well healed, minimal swelling, sutures are intact.  No signs of infection. He still has a 4 cm area of the lateral skin flap that is healing.     XRAYS: 3 views of bilateral heels obtained and reviewed today reveal interval progression of  Healing calcaneus fractures with good alignment and restoration of height.     Assessment:            Encounter Diagnosis   Name Primary?    Bilateral calcaneal fractures, with routine healing, subsequent encounter Yes          Plan:       We performed a custom orthotic/brace adjustment, fitting and training with the patient today. The patient demonstrated understanding and proper care. This was performed for 15 minutes.  Bilateral cam boots  were given.    Non-weightbearing. I instructed him on range of motion exercises.   I also refilled his pain medication and gave him flexeril for muscle spasms. I also started him on Neurontin as he was complaining of nerve pain.  F/u 3 weeks with xray of the bilateral heels.

## 2017-02-17 ENCOUNTER — TELEPHONE (OUTPATIENT)
Dept: ORTHOPEDICS | Facility: CLINIC | Age: 42
End: 2017-02-17

## 2017-02-17 NOTE — TELEPHONE ENCOUNTER
Hi ladies,  Can one of you please give me a call regarding this patient. Please see below message I received from the .     Dr. Washburn ordered Reliamed Hydrogel for Kiel to put on his wound and the pharmacy cannot get it because the  says it is out of stock..they think that means discontinued.  Can you touch base with Robyn or Yamile and find out if Dr. Washburn will order something else at [facility] for Kiel or let him know what he should do.     Thank you so much

## 2017-02-20 ENCOUNTER — TELEPHONE (OUTPATIENT)
Dept: ORTHOPEDICS | Facility: CLINIC | Age: 42
End: 2017-02-20

## 2017-02-20 NOTE — TELEPHONE ENCOUNTER
----- Message from Gretel Lisa sent at 2/20/2017 11:26 AM CST -----  Contact: Des [employer] [phone]  Please call regarding alternative medication for surgery

## 2017-02-20 NOTE — TELEPHONE ENCOUNTER
Spoke with mariano at [facility], informed her per  pt can use neosporin to put on incision not to worry about the script for the gel.She verbalized understanding.

## 2017-03-06 ENCOUNTER — TELEPHONE (OUTPATIENT)
Dept: ORTHOPEDICS | Facility: CLINIC | Age: 42
End: 2017-03-06

## 2017-03-06 DIAGNOSIS — S92.001D BILATERAL CALCANEAL FRACTURES, WITH ROUTINE HEALING, SUBSEQUENT ENCOUNTER: Primary | ICD-10-CM

## 2017-03-06 DIAGNOSIS — S92.002D BILATERAL CALCANEAL FRACTURES, WITH ROUTINE HEALING, SUBSEQUENT ENCOUNTER: Primary | ICD-10-CM

## 2017-03-06 NOTE — TELEPHONE ENCOUNTER
----- Message from Morelia Nevarez sent at 3/6/2017  8:25 AM CST -----  Contact: Self - [phone]  Patient is requesting a refill for his pain and nausea medicine. Please call him at [phone]

## 2017-03-08 NOTE — TELEPHONE ENCOUNTER
----- Message from Ana Willis sent at 3/7/2017  2:56 PM CST -----  Contact: pt [phone]  Patient called and states he is returning a call from yesterday   About a refill on his Nausea  And [facility]

## 2017-03-09 ENCOUNTER — OFFICE VISIT (OUTPATIENT)
Dept: ORTHOPEDICS | Facility: CLINIC | Age: 42
End: 2017-03-09
Payer: OTHER MISCELLANEOUS

## 2017-03-09 ENCOUNTER — HOSPITAL ENCOUNTER (OUTPATIENT)
Dept: RADIOLOGY | Facility: HOSPITAL | Age: 42
Discharge: HOME | End: 2017-03-09
Attending: ORTHOPAEDIC SURGERY
Payer: OTHER MISCELLANEOUS

## 2017-03-09 VITALS
DIASTOLIC BLOOD PRESSURE: 80 MMHG | WEIGHT: 160 LBS | BODY MASS INDEX: 26.66 KG/M2 | SYSTOLIC BLOOD PRESSURE: 116 MMHG | HEART RATE: 104 BPM | HEIGHT: 65 IN

## 2017-03-09 DIAGNOSIS — S92.001D BILATERAL CALCANEAL FRACTURES, WITH ROUTINE HEALING, SUBSEQUENT ENCOUNTER: Primary | ICD-10-CM

## 2017-03-09 DIAGNOSIS — S92.002D BILATERAL CALCANEAL FRACTURES, WITH ROUTINE HEALING, SUBSEQUENT ENCOUNTER: Primary | ICD-10-CM

## 2017-03-09 DIAGNOSIS — S92.001D BILATERAL CALCANEAL FRACTURES, WITH ROUTINE HEALING, SUBSEQUENT ENCOUNTER: ICD-10-CM

## 2017-03-09 DIAGNOSIS — S92.002D BILATERAL CALCANEAL FRACTURES, WITH ROUTINE HEALING, SUBSEQUENT ENCOUNTER: ICD-10-CM

## 2017-03-09 PROCEDURE — 99024 POSTOP FOLLOW-UP VISIT: CPT | Mod: S$GLB,,, | Performed by: ORTHOPAEDIC SURGERY

## 2017-03-09 PROCEDURE — 99999 PR PBB SHADOW E&M-EST. PATIENT-LVL III: CPT | Mod: PBBFAC,,, | Performed by: ORTHOPAEDIC SURGERY

## 2017-03-09 PROCEDURE — 73650 X-RAY EXAM OF HEEL: CPT | Mod: 26,50,, | Performed by: RADIOLOGY

## 2017-03-09 PROCEDURE — 73650 X-RAY EXAM OF HEEL: CPT | Mod: 50,TC,PN

## 2017-03-09 RX ORDER — OXYCODONE AND ACETAMINOPHEN 10; 325 MG/1; MG/1
1 TABLET ORAL EVERY 4 HOURS PRN
Qty: 84 TABLET | Refills: 0 | Status: SHIPPED | OUTPATIENT
Start: 2017-03-09 | End: 2017-03-24

## 2017-03-09 RX ORDER — PROMETHAZINE HYDROCHLORIDE 25 MG/1
25 TABLET ORAL EVERY 4 HOURS PRN
Qty: 83 TABLET | Refills: 0 | Status: SHIPPED | OUTPATIENT
Start: 2017-03-09 | End: 2017-11-02

## 2017-03-09 NOTE — PROGRESS NOTES
Subjective:      Patient ID: Kiel Ayala is a 41 y.o. male.    Chief Complaint: Post-op Evaluation of the Left Foot (DOS: 1-4-17/ORIF ) and Post-op Evaluation of the Right Foot (DOS: 1-4-17/ORIF )    HPI: Doing fairly well today s/p open reduction internal fixation Bilateral calcaneus fractures. He rates his pain as 5/10 today. I refilled his pain meds again this am.   Social History     Occupational History    Not on file.     Social History Main Topics    Smoking status: Never Smoker    Smokeless tobacco: Never Used    Alcohol use Yes      Comment: rarely    Drug use: No    Sexual activity: Not on file            Objective:    Ortho Exam     LLE: Neurovascularly intact, incisions well healed, minimal swelling, sutures are intact.  No signs of infection. No tenderness to palpation at the the fracture site.. Sensitivity at the incision site.       RLE: Neurovascularly intact, incisions well healed, minimal swelling, sutures are intact.  No signs of infection. Small areas of eschar lateral foot. No tenderness to palpation at the the fracture site.    XRAYS: 3 views of bilateral heels obtained and reviewed today reveal interval progression of  Healing calcaneus fractures with good alignment and restoration of height.     Assessment:          s/p open reduction internal fixation Bilateral calcaneus fractures.        Plan:       PT for range of motion and strengthening of the bilateral foot and ankle. Weight bearing as tolerated in boots ok to transition to regular shoe after 2 weeks.   F/u 6 weeks with xray of the bilateral heels. I discussed with him that it could be another 3 months before he can return to work.

## 2017-03-21 ENCOUNTER — CLINICAL SUPPORT (OUTPATIENT)
Dept: REHABILITATION | Facility: HOSPITAL | Age: 42
End: 2017-03-21
Attending: ORTHOPAEDIC SURGERY
Payer: OTHER MISCELLANEOUS

## 2017-03-21 DIAGNOSIS — S92.002D BILATERAL CALCANEAL FRACTURES, WITH ROUTINE HEALING, SUBSEQUENT ENCOUNTER: Primary | ICD-10-CM

## 2017-03-21 DIAGNOSIS — S92.001D BILATERAL CALCANEAL FRACTURES, WITH ROUTINE HEALING, SUBSEQUENT ENCOUNTER: Primary | ICD-10-CM

## 2017-03-21 PROCEDURE — 97161 PT EVAL LOW COMPLEX 20 MIN: CPT | Mod: PN | Performed by: PHYSICAL THERAPIST

## 2017-03-21 PROCEDURE — 97010 HOT OR COLD PACKS THERAPY: CPT | Mod: PN | Performed by: PHYSICAL THERAPIST

## 2017-03-21 PROCEDURE — 97110 THERAPEUTIC EXERCISES: CPT | Mod: PN | Performed by: PHYSICAL THERAPIST

## 2017-03-22 NOTE — PLAN OF CARE
"  TIME RECORD    Date: 03/22/2017    Start Time:  900  Stop Time:  1000    PROCEDURES:    TIMED  Procedure Time Min.    Start:  Stop:     Start:  Stop:     Start:  Stop:     Start:  Stop:          UNTIMED  Procedure Time Min.    Start:  Stop:     Start:  Stop:      Total Timed Minutes:  15  Total Timed Units:  1  Total Untimed Units:  1  Charges Billed/# of units:  2    OUTPATIENT PHYSICAL THERAPY   PATIENT EVALUATION  Onset Date: Dec 9, 2016  Primary Diagnosis:   1. Bilateral calcaneal fractures, with routine healing, subsequent encounter       Treatment Diagnosis: Gait abnormality due to B calcaneus fractures.   No past medical history on file.Pt reports no significant PMH.  Precautions: fall risk  Prior Therapy: No.  Medications: Kiel Ayala has a current medication list which includes the following prescription(s): gabapentin, gel dressing, meperidine, oxycodone-acetaminophen, promethazine, and rivaroxaban.  Nutrition:  Normal  History of Present Illness: Pt reports he was working on a roof without proper safety equipement, lost his footing and slipped off of a steep, second story roof. He landed on concrete, on his feet, then rolled to the ground, sustaining B calcaneus fractures. He had surgery to repair the shattered calcanei approximately one month later with Dr. Washburn. He was then non-weight bearing from then (12/9/16) until about 3 weeks ago, when he was cleared for WBAT with CAM boots. MD orders are for him to begin transitioning into regular shoes after 3/23/17.   Prior Level of Function: Independent  Social History: Pt works as a construction . He is , lives at home with his wife.  Place of Residence (Steps/Adaptations): No steps.  Functional Deficits Leading to Referral/Nature of Injury: Difficulty with walking due to still wearing CAM boots, pain  Patient Therapy Goals: walk normally    Subjective     Kiel Ayala states "it is getting much " "better".    Pain:  Location: ankles and feet   Description: Aching  Activities Which Increase Pain: Standing and Walking  Activities Which Decrease Pain: rest  Pain Scale: 5/10 at best 5/10 now  7/10 at worst    Objective     Posture: NA  Palpation: TTP at wound sites  Sensation: Numbness lateral feet B  DTRs:  Range of Motion/Strength:   Ankle Right  Left  Pain/Dysfunction with Movement    AROM MMT AROM MMT    Plantarflexion 50* 4+/5 30* 4-/5    Dorsiflexion 10* 4+/5 8* 4/5    Inversion 12* 4/5 15* 4-/5    Eversion 20* 4/5 15* 4/5     Pain with all, but worse R>L    Girth measured with figure 8 technique:  R: 54.6 cm; L: 56 cm    Wound: There are 3 small open wound beds on the R lateral calcaneus. Per pt, this is resolving and looks much better. Pt states Dr. Washburn told him to use Neosporin on in daily, with which he has been compliant.    The most caudal wound measures 0.8 cm long x 1.2 cm wide. There is a scab forming in the middle of the bed, with slough surrounding. Redness around borders of wound.  The middle wound is covered in a scab/scar formation.   The most ventral wound measures 0.7 cm long x 0.7 cm wide. Wound be is slough, with redness around borders of wound.     Flexibility: Decreased flexibility in B gastroc/soleus and ant tibialis muscles.   Gait: With AD.  Device Used -  Axillary crutches and B CAM boots.  Analysis: Gait is abnormal due to pattern he must assume wearing the CAM boots (lacks heel strike, slow milena).  Bed Mobility:Independent  Transfers: Independent  Special Tests: NA  Other: LEFS: 14/80  Treatment: Instruction in exercises to begin for HEP. Discussed proper weaning out of boot. Discussed PT POC with the pt.     Assessment       Initial Assessment (Pertinent finding, problem list and factors affecting outcome): Pt is a 42 y/o male who sustained B calcaneus fractures when he fell off of a roof at work. Required ORIF B. Pt was recently upgraded to WBAT in CAM boots and will be " allowed to begin weaning out of boots beginning 3/23/17. Pt demonstrates persistent edema, weakness and lack of ROM that would be expected after this kind of injury and prolonged immobilization. Pt's impairments are limiting his ability to and tolerance for standing, walking and transfers. Pt will benefit from PT to address these impairments so he can resume normal walking, standing and return to work.   Rehab Potiential: good    Short Term Goals (3 Weeks):   1. Pt will be able to walk 200 feet without AD, SBA, with least restrictive AD.  2. Pt will demonstrate >/=4/5 strength for all L ankle ROM to improve stability with walking/standing.  3. Pt will demonstrate 5* improvement in ankle inversion B for improved gait pattern.  Long Term Goals (6 Weeks):   1. Pt will be able to walk for 10 minutes in regular shoes independently with </=2/10 pain in feet.  2. Pt will be able to ascend/descend 3 flights of stairs with </=2/10 pain without UE support.  3. Pt will score >/=65/80 on LEFS.    Plan     Certification Period: 3/21/17 to 5/2/17  Recommended Treatment Plan: 3 times per week for 6 weeks (per MD orders): Electrical Stimulation IFC for pain control, Gait Training, Group Therapy, Manual Therapy, Moist Heat/ Ice, Neuromuscular Re-ed, Patient Education, Therapeutic Activites, Therapeutic Exercise and Whirlpool/Fluidotherapy  Other Recommendations: NA      Therapist: Carissa Adam, PT    I CERTIFY THE NEED FOR THESE SERVICES FURNISHED UNDER THIS PLAN OF TREATMENT AND WHILE UNDER MY CARE    Physician's comments: ________________________________________________________________________________________________________________________________________________      Physician's Name: ___________________________________

## 2017-03-24 ENCOUNTER — CLINICAL SUPPORT (OUTPATIENT)
Dept: REHABILITATION | Facility: HOSPITAL | Age: 42
End: 2017-03-24
Attending: ORTHOPAEDIC SURGERY
Payer: OTHER MISCELLANEOUS

## 2017-03-24 DIAGNOSIS — S92.001D BILATERAL CALCANEAL FRACTURES, WITH ROUTINE HEALING, SUBSEQUENT ENCOUNTER: ICD-10-CM

## 2017-03-24 DIAGNOSIS — S92.002D BILATERAL CALCANEAL FRACTURES, WITH ROUTINE HEALING, SUBSEQUENT ENCOUNTER: ICD-10-CM

## 2017-03-24 PROCEDURE — 97110 THERAPEUTIC EXERCISES: CPT | Mod: PN

## 2017-03-24 PROCEDURE — 97010 HOT OR COLD PACKS THERAPY: CPT | Mod: PN

## 2017-03-24 NOTE — PROGRESS NOTES
Name: Kiel GARCIA [facility] Number: [id number]  Date of Treatment: 03/24/2017   Diagnosis:   Encounter Diagnosis   Name Primary?    Bilateral calcaneal fractures, with routine healing, subsequent encounter        Time in: 1157  Time Out: 1305  Total Treatment Time: 68        Subjective:    Kiel reports no change in symptoms since eval, incisions healing on lateral aspect of R foot cause significant discomfort.  Patient reports he cannot fit into shoes to begin WB out of cam boot, has slippers but they cause pain in heels and over open wounds. Patient reports their pain to be 5/10 on a 0-10 scale with 0 being no pain and 10 being the worst pain imaginable.    Objective  Kiel received therapeutic exercises to develop strength, endurance, ROM and flexibility for 58 minutes including:   NuStep 10' L4  Seated gastroc stretch 3/30s B with sheet  Seated soleus stretch 3/30s B with sheet  AROM B LE's PF/DF/INV/EV 3/10  Marbles 3' each foot    CP x 10' to B heels after therex to decrease inflammation, edema, and pain.    Written Home Exercises Provided: Reports been doing HEP given on eval  Pt demo good understanding of the education provided. Kiel demonstrated good return demonstration of activities.     Assessment:   Patient tolerated therex/stretches without significant c/o increase in pain.  Did not begin WBAT out of cam boot as patient did not bring other footwear.    Pt will continue to benefit from skilled PT intervention. Medical Necessity is demonstrated by:  Unable to participate in daily activities, Continued inability to participate in vocational pursuits, Pain limits function of effected part for some activities, Requires skilled supervision to complete and progress HEP, Weakness and Edema.    Patient is making fair progress towards established goals.    Plan:  Continue with established Plan of Care towards PT goals.

## 2017-03-27 ENCOUNTER — CLINICAL SUPPORT (OUTPATIENT)
Dept: REHABILITATION | Facility: HOSPITAL | Age: 42
End: 2017-03-27
Attending: ORTHOPAEDIC SURGERY
Payer: OTHER MISCELLANEOUS

## 2017-03-27 DIAGNOSIS — S92.002D BILATERAL CALCANEAL FRACTURES, WITH ROUTINE HEALING, SUBSEQUENT ENCOUNTER: ICD-10-CM

## 2017-03-27 DIAGNOSIS — S92.001D BILATERAL CALCANEAL FRACTURES, WITH ROUTINE HEALING, SUBSEQUENT ENCOUNTER: ICD-10-CM

## 2017-03-27 PROCEDURE — 97110 THERAPEUTIC EXERCISES: CPT | Mod: PN

## 2017-03-27 PROCEDURE — 97140 MANUAL THERAPY 1/> REGIONS: CPT | Mod: PN

## 2017-03-27 RX ORDER — GABAPENTIN 300 MG/1
CAPSULE ORAL
Qty: 60 CAPSULE | Refills: 1 | Status: SHIPPED | OUTPATIENT
Start: 2017-03-27 | End: 2018-03-15

## 2017-03-27 RX ORDER — OXYCODONE AND ACETAMINOPHEN 5; 325 MG/1; MG/1
1 TABLET ORAL EVERY 6 HOURS PRN
Qty: 42 TABLET | Refills: 0 | Status: SHIPPED | OUTPATIENT
Start: 2017-03-27 | End: 2017-11-02

## 2017-03-27 NOTE — PROGRESS NOTES
Name: Kiel GARCIA [facility] Number: [id number]  Date of Treatment: 03/27/2017   Diagnosis:   Encounter Diagnosis   Name Primary?    Bilateral calcaneal fractures, with routine healing, subsequent encounter        Time in: 1400  Time Out: 1500  Total Treatment Time: 60        Subjective:    Pt states the only shoes he has to fit him is slippers due to the increased swelling in his B feet.    Kiel reports increased pain.  Patient reports their pain to be 8/10 on a 0-10 scale with 0 being no pain and 10 being the worst pain imaginable.    Objective    Patient received individual therapy to increase strength, endurance, ROM and flexibility with 0 patients with activities as follows:     Kiel received therapeutic exercises to develop strength, endurance, ROM and flexibility for 50 minutes including:     nustep x 10 min l-4  Seated gastroc stretch 3 x30 sec with strap  B LE  Seated soleus stretch 3 x 30 sec with strap B LE  DF stretch seated 5 x 10 sec each B LE  Marbles 3' each B feet      Kiel received the following manual therapy techniques: Soft tissue Mobilization and scar mobilization were applied to the: B feet for 10 minutes.       Pt demo good understanding of the education provided. Kiel demonstrated good return demonstration of activities.     Assessment:     Increased tenderness and sensitivity to B heels around healed incision R > L.    Pt will continue to benefit from skilled PT intervention. Medical Necessity is demonstrated by:  Fall Risk, Pain limits function of effected part for some activities, Unable to participate fully in daily activities, Requires skilled supervision to complete and progress HEP, Weakness and Edema.    Patient is making good progress towards established goals.          Plan:  Continue with established Plan of Care towards PT goals.

## 2017-03-29 ENCOUNTER — TELEPHONE (OUTPATIENT)
Dept: PAIN MEDICINE | Facility: CLINIC | Age: 42
End: 2017-03-29

## 2017-03-29 NOTE — TELEPHONE ENCOUNTER
Attempted to call patient x 3 to schedule Consult with Dr. Dillon. Left message with return number to schedule.

## 2017-03-29 NOTE — TELEPHONE ENCOUNTER
----- Message from Yamile Penn LPN sent at 3/28/2017  2:56 PM CDT -----  Good Afternoon,  Patient is being referred to Dr. Dillon for eval and tx of pain to bilateral ankles and feet. Patient is s/p ORIF bilateral calcaneus fractures. He is workers comp. The referral has been submitted to [employer] comp. Dr. Washburn is referring MD.  Thanks.

## 2017-03-31 ENCOUNTER — CLINICAL SUPPORT (OUTPATIENT)
Dept: REHABILITATION | Facility: HOSPITAL | Age: 42
End: 2017-03-31
Attending: ORTHOPAEDIC SURGERY
Payer: OTHER MISCELLANEOUS

## 2017-03-31 DIAGNOSIS — S92.001D BILATERAL CALCANEAL FRACTURES, WITH ROUTINE HEALING, SUBSEQUENT ENCOUNTER: ICD-10-CM

## 2017-03-31 DIAGNOSIS — S92.002D BILATERAL CALCANEAL FRACTURES, WITH ROUTINE HEALING, SUBSEQUENT ENCOUNTER: ICD-10-CM

## 2017-03-31 PROCEDURE — 97116 GAIT TRAINING THERAPY: CPT | Mod: PN

## 2017-03-31 PROCEDURE — 97110 THERAPEUTIC EXERCISES: CPT | Mod: PN

## 2017-03-31 PROCEDURE — 97010 HOT OR COLD PACKS THERAPY: CPT | Mod: PN

## 2017-03-31 NOTE — PROGRESS NOTES
Name: Kiel GARCIA [facility] Number: [id number]  Date of Treatment: 03/31/2017   Diagnosis:   Encounter Diagnosis   Name Primary?    Bilateral calcaneal fractures, with routine healing, subsequent encounter        Time in: 1100  Time Out: 1212  Total Treatment Time: 72      Subjective:    Kiel reports that the wounds on lateral aspect of R heel, distal/anterior to calcaneus are improving in healing, but R heel remains primary source of pain.  Patient reports their pain to be 8/10 on a 0-10 scale with 0 being no pain and 10 being the worst pain imaginable.    Objective  Kiel received therapeutic exercises to develop strength, endurance, ROM and flexibility for 62 minutes including:    Bike 10' L1, seat position 4   Seated gastroc stretch 3 x30 sec with strap B LE   Seated soleus stretch 3 x 30 sec with strap B LE   DF stretch seated 10 x 10 sec each B LE   Marbles 3' B   AROM B feet PF/DF/INV/EV 3/10    Gait training with B crutches and rubber bottom slippers (as no other shoes will fit his feet due to swelling)     CP to B heels for 10 minutes.     Written Home Exercises Provided: Reports compliance with HEP  Pt demo good understanding of the education provided. Kiel demonstrated good return demonstration of activities.     Assessment:   Gait training remains painful for patient in B heels.  Inv/Ev remains painful as well.    Pt will continue to benefit from skilled PT intervention. Medical Necessity is demonstrated by:  Fall Risk, Pain limits function of effected part for some activities, Unable to participate fully in daily activities, Requires skilled supervision to complete and progress HEP and Weakness.    Patient is making good progress towards established goals.    Plan:  Continue with established Plan of Care towards PT goals.

## 2017-04-03 ENCOUNTER — CLINICAL SUPPORT (OUTPATIENT)
Dept: REHABILITATION | Facility: HOSPITAL | Age: 42
End: 2017-04-03
Attending: ORTHOPAEDIC SURGERY
Payer: OTHER MISCELLANEOUS

## 2017-04-03 DIAGNOSIS — S92.002D BILATERAL CALCANEAL FRACTURES, WITH ROUTINE HEALING, SUBSEQUENT ENCOUNTER: ICD-10-CM

## 2017-04-03 DIAGNOSIS — S92.001D BILATERAL CALCANEAL FRACTURES, WITH ROUTINE HEALING, SUBSEQUENT ENCOUNTER: ICD-10-CM

## 2017-04-03 PROCEDURE — 97010 HOT OR COLD PACKS THERAPY: CPT | Mod: PN | Performed by: PHYSICAL THERAPIST

## 2017-04-03 PROCEDURE — 97110 THERAPEUTIC EXERCISES: CPT | Mod: PN | Performed by: PHYSICAL THERAPIST

## 2017-04-03 NOTE — PROGRESS NOTES
Name: Kiel GARCIA [facility] Number: [id number]  Date of Treatment: 04/03/2017   Diagnosis:   Encounter Diagnosis   Name Primary?    Bilateral calcaneal fractures, with routine healing, subsequent encounter        Time in: 1110  Time Out: 1205  Total Treatment Time: 55  Group Time: 0      Subjective:    Kiel reports he is feeling about the same as last time, with pain worse in the RLE>LLE.  Patient reports their pain to be 3/10 L, 8/10 R on a 0-10 scale with 0 being no pain and 10 being the worst pain imaginable.    Objective    Patient received individual therapy to increase strength, ROM and flexibility with 0 patients with activities as follows:     Kiel received therapeutic exercises to develop strength, ROM and flexibility for 40 minutes including:     Bike, L4, 10 min  Gastroc and soleus stretches, L/R, with strap and footstool, 3x30s each  Isometric ankle DF/PF, Inversion/Eversion 30x each, L/R  Circles, clockwise/counterclockwise, 30x each  PROM all ankle planes, L/R, 5 min each    AROM  Right  Left  DF  14*  18*  PF  25*  24*  Inver  20*  18*  Ever  8*  16*    Patient received CP to B heels (wrapped) x 10 min    Written Home Exercises Provided: Yes  Pt demo good understanding of the education provided. Kiel demonstrated good return demonstration of activities.     Assessment:   Pt progress continues to be limited by his reactivity. Pt was given AROM and isometric strengthening exercises to add to HEP to speed up ROM recovery.     Pt will continue to benefit from skilled PT intervention. Medical Necessity is demonstrated by:  Fall Risk, Continued inability to participate in vocational pursuits, Pain limits function of effected part for some activities, Unable to participate fully in daily activities, Weakness and Edema.    Patient is making good progress towards established goals.    New/Revised goals: NA      Plan:  Continue with established Plan of Care towards PT goals.

## 2017-04-05 ENCOUNTER — CLINICAL SUPPORT (OUTPATIENT)
Dept: REHABILITATION | Facility: HOSPITAL | Age: 42
End: 2017-04-05
Attending: ORTHOPAEDIC SURGERY
Payer: OTHER MISCELLANEOUS

## 2017-04-05 DIAGNOSIS — S92.001D BILATERAL CALCANEAL FRACTURES, WITH ROUTINE HEALING, SUBSEQUENT ENCOUNTER: ICD-10-CM

## 2017-04-05 DIAGNOSIS — S92.002D BILATERAL CALCANEAL FRACTURES, WITH ROUTINE HEALING, SUBSEQUENT ENCOUNTER: ICD-10-CM

## 2017-04-05 PROCEDURE — 97110 THERAPEUTIC EXERCISES: CPT | Mod: PN | Performed by: PHYSICAL THERAPIST

## 2017-04-05 PROCEDURE — 97140 MANUAL THERAPY 1/> REGIONS: CPT | Mod: PN | Performed by: PHYSICAL THERAPIST

## 2017-04-05 PROCEDURE — 97010 HOT OR COLD PACKS THERAPY: CPT | Mod: PN | Performed by: PHYSICAL THERAPIST

## 2017-04-05 PROCEDURE — 97116 GAIT TRAINING THERAPY: CPT | Mod: PN | Performed by: PHYSICAL THERAPIST

## 2017-04-06 NOTE — PROGRESS NOTES
Name: Kiel GARCIA [facility] Number: [id number]  Date of Treatment: 4/5/17   Diagnosis:   Encounter Diagnosis   Name Primary?    Bilateral calcaneal fractures, with routine healing, subsequent encounter        Time in: 1056  Time Out: 1200  Total Treatment Time: 64  Group Time: 0      Subjective:    Kiel reports he is trying to wean himself out of the CAM boots, but is having difficulty doing so because of the R foot pain. He   Patient reports their pain to be 7/10 R, 3/10 L/10 on a 0-10 scale with 0 being no pain and 10 being the worst pain imaginable. Pt is non-compliant with PT HEP. Pt states he saw pain mgmt doctor last week, but cannot get meds for a few more days because of how recently he was prescribed pain meds by Dr. Washburn.    Objective  TTP at bend of ankle, R foot. Hypomobility of tarsal bones in the R midfoot.     Patient received individual therapy to increase strength, ROM and flexibility with 0 patients with activities as follows:     Kiel received therapeutic exercises to develop strength, ROM and flexibility for 26 minutes including:     Bike x 10  Min, L4  Gastroc and soleus st with strap and footstool, 3x30s  AAROM circles CW/CCW, 30x each  PROM all ankle planes: R 5 min, L 3 min    Manual therapy: STM to soft tissues at ventral aspect of ankle at bend, generalized PA and AP glides of the tarsal bones grade III, 20 min.     Gait training: with slippers donned B, one axillary crutch on L side, cuing for heel strike and in parallel bars, without UE support B slippers donned, cuing for heel strike. 8 min.     Cold pack to B rearfoot, 10 min.    Assessment:   Pt able to bear weight through the BLE during ambulation without AD/UE support and wearing slippers, although he expresses pain throughout.     Pt will continue to benefit from skilled PT intervention. Medical Necessity is demonstrated by:  Fall Risk, Pain limits function of effected part for some activities, Unable to participate fully in  daily activities, Requires skilled supervision to complete and progress HEP and Weakness.    Patient is making good progress towards established goals.    New/Revised goals: NA      Plan:  Continue with established Plan of Care towards PT goals.

## 2017-04-07 ENCOUNTER — CLINICAL SUPPORT (OUTPATIENT)
Dept: REHABILITATION | Facility: HOSPITAL | Age: 42
End: 2017-04-07
Attending: ORTHOPAEDIC SURGERY
Payer: OTHER MISCELLANEOUS

## 2017-04-07 DIAGNOSIS — S92.002D BILATERAL CALCANEAL FRACTURES, WITH ROUTINE HEALING, SUBSEQUENT ENCOUNTER: ICD-10-CM

## 2017-04-07 DIAGNOSIS — S92.001D BILATERAL CALCANEAL FRACTURES, WITH ROUTINE HEALING, SUBSEQUENT ENCOUNTER: ICD-10-CM

## 2017-04-07 PROCEDURE — 97150 GROUP THERAPEUTIC PROCEDURES: CPT | Mod: PN

## 2017-04-07 PROCEDURE — 97010 HOT OR COLD PACKS THERAPY: CPT | Mod: PN

## 2017-04-07 NOTE — PROGRESS NOTES
Name: Kiel GARCIA [facility] Number: [id number]  Date of Treatment: 04/07/2017   Diagnosis:   Encounter Diagnosis   Name Primary?    Bilateral calcaneal fractures, with routine healing, subsequent encounter        Time in: 1102  Time Out: 1208  Total Treatment Time: 66  Group Time: 56      Subjective:    Kiel reports L foot is improving better than the R, R remains causing pain in ankle and top of foot.  Patient reports their pain to be 7/10 on a 0-10 scale with 0 being no pain and 10 being the worst pain imaginable in R foot and 3/10 in L foot.    Objective    Patient received group therapy to increase strength, endurance, ROM and flexibility with 1 patients with activities as follows:     Kiel received therapeutic exercises to develop strength, endurance, ROM and flexibility for 56 minutes including:    Bike x 10 Min   Gastroc and soleus st with strap and footstool, 3x30s   AAROM circles CW/CCW, 30x each   AROM 4 ways with RTB L 3/10, YTB R     Gait training: with slippers donned B, one axillary crutch on L side, cuing for heel strike and in parallel bars, without UE support B slippers donned, cuing for heel strike. 3 min.      Cold pack to B rearfoot, 10 min    Written Home Exercises Provided: Cont with current HEP  Pt demo good understanding of the education provided. Kiel demonstrated good return demonstration of activities.     Assessment:   Tolerated therex with therband with minimal difficulty.    Pt will continue to benefit from skilled PT intervention. Medical Necessity is demonstrated by:  Fall Risk, Continued inability to participate in vocational pursuits, Pain limits function of effected part for some activities, Requires skilled supervision to complete and progress HEP and Weakness.    Patient is making good progress towards established goals.      Plan:  Continue with established Plan of Care towards PT goals.

## 2017-04-10 ENCOUNTER — TELEPHONE (OUTPATIENT)
Dept: REHABILITATION | Facility: HOSPITAL | Age: 42
End: 2017-04-10

## 2017-04-12 ENCOUNTER — CLINICAL SUPPORT (OUTPATIENT)
Dept: REHABILITATION | Facility: HOSPITAL | Age: 42
End: 2017-04-12
Attending: ORTHOPAEDIC SURGERY
Payer: OTHER MISCELLANEOUS

## 2017-04-12 DIAGNOSIS — S92.002D BILATERAL CALCANEAL FRACTURES, WITH ROUTINE HEALING, SUBSEQUENT ENCOUNTER: ICD-10-CM

## 2017-04-12 DIAGNOSIS — S92.001D BILATERAL CALCANEAL FRACTURES, WITH ROUTINE HEALING, SUBSEQUENT ENCOUNTER: ICD-10-CM

## 2017-04-12 PROCEDURE — 97140 MANUAL THERAPY 1/> REGIONS: CPT | Mod: PN

## 2017-04-12 PROCEDURE — 97110 THERAPEUTIC EXERCISES: CPT | Mod: PN

## 2017-04-12 NOTE — PROGRESS NOTES
"Name: Kiel GARCIA [facility] Number: [id number]  Date of Treatment: 04/12/2017   Diagnosis:   Encounter Diagnosis   Name Primary?    Bilateral calcaneal fractures, with routine healing, subsequent encounter        Time in: 1100  Time Out: 1155  Total Treatment Time: 54      Subjective:    Kiel reports "I feel unsteady when I use only one crutch."  Pt arrived to therapy using B axillary crutches.  Patient reports their pain to be 7/10 R heel, 3/10 L heel on a 0-10 scale with 0 being no pain and 10 being the worst pain imaginable.  Pt reports he performs HEP "when I have a chance".  Pt reported he has a 9 month old daughter, and does not like to perform HEP when she is awake.  Objective    Patient received individual therapy to increase strength, flexibility and balance, gait with activities as follows:     Kiel received therapeutic exercises to develop strength, flexibility and balance, gait for 44 minutes including:     Bike x 10 minutes  Standing gastroc stretch, gentle 3 x 30 sec B  Seated HR, TR x 30 each B  Hamstring stretch 3 x 30 sec B  Toe curls x 30 B  Ankle circles cw, ccw x 30 B each  Ankle 4 way RTB x 30 each B    Kiel received the following manual therapy techniques: Soft tissue Mobilization to B heel region, PROM B ankles, gentle a/p, med/lat joint mobs B ankles for 10 minutes.     Written Home Exercises Provided: cont HEP  Pt demo good understanding of the education provided. Kiel demonstrated fair return demonstration of activities.     Assessment:     Adjusted handle height up one notch B crutches.  Instructed pt to use only one crutch so he can gain strength, flexibility, and proper gait pattern.  Pt ambulated with B axillary crutches with knee flexion and decreased heel strike.  Reviewed with pt correct gait pattern, knee extension with heel strike.  Pt was able to return demonstrate with VC.  Pt did increase wb on single crutch when ambulating out of therapy.  VC to place more weight through " LE's, but still use crutch for balance and support.  Pt will continue to benefit from skilled PT intervention. Medical Necessity is demonstrated by:  Fall Risk, Pain limits function of effected part for some activities, Unable to participate fully in daily activities, Requires skilled supervision to complete and progress HEP and Weakness.    Patient is making fair progress towards established goals.      Plan:  Continue with established Plan of Care towards PT goals.

## 2017-04-13 ENCOUNTER — CLINICAL SUPPORT (OUTPATIENT)
Dept: REHABILITATION | Facility: HOSPITAL | Age: 42
End: 2017-04-13
Attending: ORTHOPAEDIC SURGERY
Payer: OTHER MISCELLANEOUS

## 2017-04-13 DIAGNOSIS — S92.001D BILATERAL CALCANEAL FRACTURES, WITH ROUTINE HEALING, SUBSEQUENT ENCOUNTER: ICD-10-CM

## 2017-04-13 DIAGNOSIS — S92.002D BILATERAL CALCANEAL FRACTURES, WITH ROUTINE HEALING, SUBSEQUENT ENCOUNTER: ICD-10-CM

## 2017-04-13 PROCEDURE — 97110 THERAPEUTIC EXERCISES: CPT | Mod: PN

## 2017-04-13 PROCEDURE — 97140 MANUAL THERAPY 1/> REGIONS: CPT | Mod: PN

## 2017-04-13 NOTE — PROGRESS NOTES
"Name: Kiel GARCIA [facility] Number: [id number]  Date of Treatment: 04/13/2017   Diagnosis:   Encounter Diagnosis   Name Primary?    Bilateral calcaneal fractures, with routine healing, subsequent encounter        Time in: 11:03  Time Out: 12:00  Total Treatment Time: 55  Group Time: 0      Subjective:    Kiel reports feeling ok but no notable changes in symptoms.  Patient reports their pain to be 3/10 on L and 7/10 on R/10 on a 0-10 scale with 0 being no pain and 10 being the worst pain imaginable.    Objective    Patient received individual therapy to increase strength, ROM and flexibility with 0 patients with activities as follows:     Kiel received therapeutic exercises to develop strength, ROM and flexibility for 45 minutes including:   Bike x 5 minutes  Seated B gastroc and soleus stretches with sheet 3 x 30"  Seated HR, TR x 30 each B  Toe curls x 30 B  Ankle circles cw, ccw x 30 B each  Ankle 4 way RTB x 30 each B    Gait: ~200' x 2 with SBA using R crutch. Minor instability noted, pt edu to take time when walking to prevent fall.    STM and PROM to B heel/ankle x 10' after therex    Written Home Exercises Provided: cont current  Pt demo good understanding of the education provided. Kiel demonstrated good return demonstration of activities.     Assessment:   Noted continued increased pain, guarding and decreased ROM in R>L foot/ankle. Pt progressing well with gait, as pt was able to walk into/out of gym with R crutch only.     Pt will continue to benefit from skilled PT intervention. Medical Necessity is demonstrated by:  Fall Risk, Continued inability to participate in vocational pursuits, Pain limits function of effected part for some activities, Requires skilled supervision to complete and progress HEP and Weakness.    Patient is making good progress towards established goals.    New/Revised goals: N/A      Plan:  Continue with established Plan of Care towards PT goals.   "

## 2017-04-21 DIAGNOSIS — S92.002D BILATERAL CALCANEAL FRACTURES, WITH ROUTINE HEALING, SUBSEQUENT ENCOUNTER: Primary | ICD-10-CM

## 2017-04-21 DIAGNOSIS — S92.001D BILATERAL CALCANEAL FRACTURES, WITH ROUTINE HEALING, SUBSEQUENT ENCOUNTER: Primary | ICD-10-CM

## 2017-04-24 ENCOUNTER — CLINICAL SUPPORT (OUTPATIENT)
Dept: REHABILITATION | Facility: HOSPITAL | Age: 42
End: 2017-04-24
Attending: ORTHOPAEDIC SURGERY
Payer: OTHER MISCELLANEOUS

## 2017-04-24 DIAGNOSIS — S92.002D BILATERAL CALCANEAL FRACTURES, WITH ROUTINE HEALING, SUBSEQUENT ENCOUNTER: ICD-10-CM

## 2017-04-24 DIAGNOSIS — S92.001D BILATERAL CALCANEAL FRACTURES, WITH ROUTINE HEALING, SUBSEQUENT ENCOUNTER: ICD-10-CM

## 2017-04-24 PROCEDURE — 97010 HOT OR COLD PACKS THERAPY: CPT | Mod: PN | Performed by: PHYSICAL THERAPIST

## 2017-04-24 PROCEDURE — 97110 THERAPEUTIC EXERCISES: CPT | Mod: PN | Performed by: PHYSICAL THERAPIST

## 2017-04-24 PROCEDURE — 97140 MANUAL THERAPY 1/> REGIONS: CPT | Mod: PN | Performed by: PHYSICAL THERAPIST

## 2017-04-24 NOTE — PROGRESS NOTES
"Name: Kiel GARCIA [facility] Number: [id number]  Date of Treatment: 04/24/2017   Diagnosis:   Encounter Diagnosis   Name Primary?    Bilateral calcaneal fractures, with routine healing, subsequent encounter        Time in: 1100  Time Out: 1204  Total Treatment Time: 64  Group Time: 0      Subjective:    Kiel reports he did a lot of walking while on vacation. Continues to have pain in B feet, R>L.  Patient reports their pain to be 2-3/10 L, 7/10 R/10 on a 0-10 scale with 0 being no pain and 10 being the worst pain imaginable.    Objective    Patient received individual therapy to increase strength, ROM and flexibility with 0 patients with activities as follows:     Kiel received therapeutic exercises to develop strength, ROM and flexibility for 44 minutes including:     Nustep x 10 min, L6 (LEs only)  Standing gastroc st on 1/2 roll, L/R, 3x30s each  Ankle 4way PREs, RTB, 3x10  Standing HR (in available range), 2x10  Seated TR, 30x  CW/CCW circles (AROM), 30x each direction, L/R  PROM all planes with the R ankle    Manual therapy x 10 min: PA talus glides, talus distraction, grade I-III, to decrease pain and improve ROM, and R achilles "S" stretch.    The patient received CP to B rearfoot, 10 min.    AROM/MMT  Right  Left  DF   12*, 4+/5 16*, 5/5  PF   30*, 4+/5 40*, 5/5  Inversion  24*, 5/5 32*, 5/5  Eversion  12*, 5/5 20*, 5/5    Written Home Exercises Provided: Yes  Pt demo good understanding of the education provided. Kiel demonstrated good return demonstration of activities.     Assessment:   Pt continues to demonstrate antalgic limp when he walks short distances in gym without AD. I have encouraged him, again, to try to walk without AD at home so he can wean himself off of the crutch. He is still unable to wear regular shoes due to the pain caused by the pressure of the shoes on his heels. Progress continues to be limited by pt's complaints of pain in the R foot.    Pt will continue to benefit from skilled " PT intervention. Medical Necessity is demonstrated by:  Continued inability to participate in vocational pursuits, Unable to participate fully in daily activities, Requires skilled supervision to complete and progress HEP and Weakness.    Patient is making good progress towards established goals.    New/Revised goals: NA      Plan:  Continue with established Plan of Care towards PT goals.

## 2017-04-26 ENCOUNTER — CLINICAL SUPPORT (OUTPATIENT)
Dept: REHABILITATION | Facility: HOSPITAL | Age: 42
End: 2017-04-26
Attending: ORTHOPAEDIC SURGERY
Payer: OTHER MISCELLANEOUS

## 2017-04-26 DIAGNOSIS — S92.001D BILATERAL CALCANEAL FRACTURES, WITH ROUTINE HEALING, SUBSEQUENT ENCOUNTER: ICD-10-CM

## 2017-04-26 DIAGNOSIS — S92.002D BILATERAL CALCANEAL FRACTURES, WITH ROUTINE HEALING, SUBSEQUENT ENCOUNTER: ICD-10-CM

## 2017-04-26 PROCEDURE — 97140 MANUAL THERAPY 1/> REGIONS: CPT | Mod: PN | Performed by: PHYSICAL THERAPIST

## 2017-04-26 PROCEDURE — 97110 THERAPEUTIC EXERCISES: CPT | Mod: PN | Performed by: PHYSICAL THERAPIST

## 2017-04-26 PROCEDURE — 97010 HOT OR COLD PACKS THERAPY: CPT | Mod: PN | Performed by: PHYSICAL THERAPIST

## 2017-04-26 NOTE — PROGRESS NOTES
"Name: Kiel GARCIA [facility] Number: [id number]  Date of Treatment: 04/26/2017   Diagnosis:   Encounter Diagnosis   Name Primary?    Bilateral calcaneal fractures, with routine healing, subsequent encounter        Time in: 1055  Time Out: 1159  Total Treatment Time: 64  Group Time: 0      Subjective:    Kiel reports he is feeling about the same as last session. Sees Dr. Washburn tomorrow for f/u.  Patient reports their pain to be 2-3/10 in L foot, 7/10 in R foot/10 on a 0-10 scale with 0 being no pain and 10 being the worst pain imaginable.    Objective    Patient received individual therapy to increase strength, ROM and flexibility with 0 patients with activities as follows:     Kiel received therapeutic exercises to develop strength, ROM and flexibility for 44 minutes including:     Bike x 10 min, L4  Standing gastroc st on 1/2 roll, L/R, 3x30s each  Ankle 4way PREs, RTB R foot, GTB L foot, 3x10  Standing HR (in available range), 3x10  Seated TR, 30x  CW/CCW circles (AROM), 30x each direction, L  CW/CCW circles in standing on gold board (AAROM), 30x each direction, R  PROM all planes with the R ankle, 5 min  Bridges, 3x10  Shuttle BLE press, 25#, 3x10     Manual therapy x 10 min: PA talus glides, grade I-III, to decrease pain and improve ROM, and R achilles "S" stretch.     The patient received CP to B rearfoot, 10 min.     AROM/MMT     Right   Left  DF     12*, 5/5  17*, 5/5  PF     30*, 4+/5   40*, 5/5  Inversion   24*, 4+/5     32*, 5/5  Eversion   14*, 4+/5     25*, 5/5      Assessment:   Pt continues to be limited in progression of PT due to pain in the right foot. Continues to require use of unilateral AD because of persistent R foot pain in weight bearing, which causes antalgic limp.     Pt will continue to benefit from skilled PT intervention. Medical Necessity is demonstrated by:  Continued inability to participate in vocational pursuits, Pain limits function of effected part for some activities, Unable " to participate fully in daily activities, Requires skilled supervision to complete and progress HEP and Weakness.    Patient is making good progress towards established goals.    New/Revised goals: NA      Plan:  Continue with established Plan of Care towards PT goals.

## 2017-04-27 ENCOUNTER — OFFICE VISIT (OUTPATIENT)
Dept: ORTHOPEDICS | Facility: CLINIC | Age: 42
End: 2017-04-27
Payer: OTHER MISCELLANEOUS

## 2017-04-27 ENCOUNTER — HOSPITAL ENCOUNTER (OUTPATIENT)
Dept: RADIOLOGY | Facility: HOSPITAL | Age: 42
Discharge: HOME | End: 2017-04-27
Attending: ORTHOPAEDIC SURGERY
Payer: OTHER MISCELLANEOUS

## 2017-04-27 VITALS
WEIGHT: 160 LBS | BODY MASS INDEX: 26.66 KG/M2 | HEART RATE: 106 BPM | SYSTOLIC BLOOD PRESSURE: 128 MMHG | DIASTOLIC BLOOD PRESSURE: 75 MMHG | HEIGHT: 65 IN

## 2017-04-27 DIAGNOSIS — S92.002D BILATERAL CALCANEAL FRACTURES, WITH ROUTINE HEALING, SUBSEQUENT ENCOUNTER: Primary | ICD-10-CM

## 2017-04-27 DIAGNOSIS — S92.001D BILATERAL CALCANEAL FRACTURES, WITH ROUTINE HEALING, SUBSEQUENT ENCOUNTER: ICD-10-CM

## 2017-04-27 DIAGNOSIS — S92.001D BILATERAL CALCANEAL FRACTURES, WITH ROUTINE HEALING, SUBSEQUENT ENCOUNTER: Primary | ICD-10-CM

## 2017-04-27 DIAGNOSIS — S92.002D BILATERAL CALCANEAL FRACTURES, WITH ROUTINE HEALING, SUBSEQUENT ENCOUNTER: ICD-10-CM

## 2017-04-27 PROCEDURE — 99999 PR PBB SHADOW E&M-EST. PATIENT-LVL III: CPT | Mod: PBBFAC,,, | Performed by: ORTHOPAEDIC SURGERY

## 2017-04-27 PROCEDURE — 73650 X-RAY EXAM OF HEEL: CPT | Mod: 26,50,, | Performed by: RADIOLOGY

## 2017-04-27 PROCEDURE — 99214 OFFICE O/P EST MOD 30 MIN: CPT | Mod: 25,S$GLB,, | Performed by: ORTHOPAEDIC SURGERY

## 2017-04-27 PROCEDURE — 73650 X-RAY EXAM OF HEEL: CPT | Mod: 50,TC,PN

## 2017-04-27 PROCEDURE — 20600 DRAIN/INJ JOINT/BURSA W/O US: CPT | Mod: RT,S$GLB,, | Performed by: ORTHOPAEDIC SURGERY

## 2017-04-27 RX ORDER — TRIAMCINOLONE ACETONIDE 40 MG/ML
40 INJECTION, SUSPENSION INTRA-ARTICULAR; INTRAMUSCULAR
Status: DISCONTINUED | OUTPATIENT
Start: 2017-04-27 | End: 2017-04-27 | Stop reason: HOSPADM

## 2017-04-27 RX ADMIN — TRIAMCINOLONE ACETONIDE 40 MG: 40 INJECTION, SUSPENSION INTRA-ARTICULAR; INTRAMUSCULAR at 09:04

## 2017-04-27 NOTE — PROCEDURES
Small Joint Aspiration/Injection  Date/Time: 4/27/2017 9:12 AM  Performed by: ECTOR BOWDEN  Authorized by: ECTOR BOWDEN     Consent Done?:  Yes (Verbal)  Indications:  Pain  Site marked: The procedure site was marked      Location:  Foot  Site:  R intertarsal  Prep: Patient was prepped and draped in usual sterile fashion    Needle size:  22 G  Approach:  Lateral  Medications:  40 mg triamcinolone acetonide 40 mg/mL  Patient tolerance:  Patient tolerated the procedure well with no immediate complications

## 2017-04-27 NOTE — PROGRESS NOTES
Subjective:      Patient ID: Kiel Ayala is a 41 y.o. male.    Chief Complaint: Pain of the Left Foot (DOS: 1-4-17/ORIF ) and Pain of the Right Foot (DOS: 1-4-17/ORIF )    HPI: Doing fairly well today s/p open reduction internal fixation Bilateral calcaneus fractures. He rates his pain as 2/10 today on the left and 7/10 on the right. He is doing well with PT. He is walking in a slipper on the left and a boot on the right. He says he feels popping and severe pain on the right with moving the ankle.     Social History     Occupational History    Not on file.     Social History Main Topics    Smoking status: Never Smoker    Smokeless tobacco: Never Used    Alcohol use Yes      Comment: rarely    Drug use: No    Sexual activity: Not on file            Objective:    Ortho Exam     LLE: Neurovascularly intact, incisions well healed, minimal swelling, sutures are intact.  No signs of infection. No tenderness to palpation at the the fracture site. Sensitivity at the incision site.   Improving range of motion of the ankle and subtalar joints but still decreased from normal.    RLE: Neurovascularly intact, incisions well healed, minimal swelling, sutures are intact.  No signs of infection. Incision is now healed with some mild keloid.  No tenderness to palpation at the the fracture site. Very tender at the subtalar joint. Improving range of motion of the ankle and subtalar joints but still decreased from normal.    XRAYS: 3 views of bilateral heels obtained and reviewed today reveal interval progression of  Healing calcaneus fractures with good alignment and restoration of height.     Assessment:          s/p open reduction internal fixation Bilateral calcaneus fractures.        Plan:     I injected the right subtalar joint today.   Continue PT for range of motion and strengthening of the bilateral foot and ankle. Weight bearing as tolerated in boots ok to transition to regular shoes.   F/u 6 weeks with xray of  the bilateral heels. I discussed with him that   he may need subtalar fusion sooner than expected on the right if pain persists.

## 2017-04-28 ENCOUNTER — CLINICAL SUPPORT (OUTPATIENT)
Dept: REHABILITATION | Facility: HOSPITAL | Age: 42
End: 2017-04-28
Attending: ORTHOPAEDIC SURGERY
Payer: OTHER MISCELLANEOUS

## 2017-04-28 ENCOUNTER — TELEPHONE (OUTPATIENT)
Dept: ORTHOPEDICS | Facility: CLINIC | Age: 42
End: 2017-04-28

## 2017-04-28 DIAGNOSIS — S92.001D BILATERAL CALCANEAL FRACTURES, WITH ROUTINE HEALING, SUBSEQUENT ENCOUNTER: ICD-10-CM

## 2017-04-28 DIAGNOSIS — S92.002D BILATERAL CALCANEAL FRACTURES, WITH ROUTINE HEALING, SUBSEQUENT ENCOUNTER: ICD-10-CM

## 2017-04-28 PROCEDURE — 97110 THERAPEUTIC EXERCISES: CPT | Mod: PN

## 2017-04-28 NOTE — TELEPHONE ENCOUNTER
----- Message from Janeen Grimm sent at 4/28/2017 10:50 AM CDT -----  Contact: des [phone] or email [email or web address]  Pt wants to know if he should still use the flexanator/ If not then it is costly and there is no need to keep on hand for the patient/ it was not discussed at the recent appt/ this pt has a new nurse manager so this info can be given to Des and she will forward on to the new nurse / please call or email/ thanks

## 2017-04-28 NOTE — PROGRESS NOTES
[facility] Number: [id number]  Date of Treatment: 04/28/2017   Diagnosis:   Encounter Diagnosis   Name Primary?    Bilateral calcaneal fractures, with routine healing, subsequent encounter        Time in: 1102  Time Out: 1158  Total Treatment Time: 56      Subjective:    Kiel reports getting a cortisone shot in R ankle, and he reports already feeling relief .  Patient reports their pain to be 5/10 on a 0-10 scale with 0 being no pain and 10 being the worst pain imaginable in R ankle, and 3/10 in L ankle    Objective  Kiel received therapeutic exercises to develop strength, endurance, ROM, flexibility, posture and core stabilization for 56 minutes including:    Bike x 10 min, L4   Standing gastroc st on 1/2 roll, L/R, 3x30s each   Ankle 4way PREs, RTB R foot, GTB L foot, 3x10   Standing HR (in available range), 3x10   Seated TR, 30x   CW/CCW circles (AROM), 30x each direction, L   CW/CCW circles in standing on gold board (AAROM), 30x each direction, R  PROM all planes with the R ankle, 5 min   Bridges, 3x10   Shuttle BLE press, 50#, 3x10   L LE 50# 3/10   R LE 37# 3/10    Written Home Exercises Provided: cont with HEP  Pt demo good understanding of the education provided. Kiel demonstrated good return demonstration of activities.     Assessment:   Increased tolerance for therex on the R LE.    Pt will continue to benefit from skilled PT intervention. Medical Necessity is demonstrated by:  Fall Risk, Continued inability to participate in vocational pursuits, Pain limits function of effected part for some activities, Unable to participate fully in daily activities, Requires skilled supervision to complete and progress HEP and Weakness.    Patient is making good progress towards established goals.    Plan:  Continue with established Plan of Care towards PT goals.

## 2017-05-01 ENCOUNTER — CLINICAL SUPPORT (OUTPATIENT)
Dept: REHABILITATION | Facility: HOSPITAL | Age: 42
End: 2017-05-01
Attending: ORTHOPAEDIC SURGERY
Payer: OTHER MISCELLANEOUS

## 2017-05-01 DIAGNOSIS — S92.001D BILATERAL CALCANEAL FRACTURES, WITH ROUTINE HEALING, SUBSEQUENT ENCOUNTER: ICD-10-CM

## 2017-05-01 DIAGNOSIS — S92.002D BILATERAL CALCANEAL FRACTURES, WITH ROUTINE HEALING, SUBSEQUENT ENCOUNTER: ICD-10-CM

## 2017-05-01 PROCEDURE — 97110 THERAPEUTIC EXERCISES: CPT | Mod: PN | Performed by: PHYSICAL THERAPIST

## 2017-05-01 NOTE — PLAN OF CARE
TIME RECORD    Date: 05/01/2017    Start Time:  1100  Stop Time:  1200    PROCEDURES:    TIMED  Procedure Time Min.    Start:  Stop:     Start:  Stop:     Start:  Stop:     Start:  Stop:          UNTIMED  Procedure Time Min.    Start:  Stop:     Start:  Stop:      Total Timed Minutes:  45  Total Timed Units:  3  Total Untimed Units:  0  Charges Billed/# of units:  3    PHYSICAL THERAPY UPDATED PLAN OF TREATMENT    Patient name: Kiel Ayala  Onset Date:  12/9/16  SOC Date:  3/21/17  Primary Diagnosis:    1. Bilateral calcaneal fractures, with routine healing, subsequent encounter       Treatment Diagnosis:  Gait abnormality s/p ORIF for B calcaneus fx  Certification Period:  5/1/17 to 6/12/17  Precautions:  standard  Visits from SOC:  13  Functional Level Prior to SOC:  Independent. Worked in construction.    Updated Assessment:    SUBJECTIVE: pt continues to experience pain in B heels, rating the R at 6/10 and L at 2-3/10. Pt states he can deal with the L heel pain, but the R heel pain is causing him to limp and he has a very difficult time with walking. He is still unable to wear regular shoes due to the pain it causes in the right heel, so he wears slippers to PT.     OBJECTIVE:   AROM/MMT  Right  Left  DF   15*, 5/5 18*, 5/5  PF   42*, 4+/5 43*, 5/5  Inversion  18*, 4+/5 30*, 5/5  Eversion  16*, 4+/5 20*, 5/5    Gait: continues with antalgic limp, with foot flat contact of the RLE. No longer requires use of AD.     LEFS: 17/80    Short Term Goals (3 Weeks):   1. Pt will be able to walk 200 feet without AD, SBA, with least restrictive AD.  2. Pt will demonstrate >/=4/5 strength for all L ankle ROM to improve stability with walking/standing.  3. Pt will demonstrate 5* improvement in ankle inversion B for improved gait pattern.  Long Term Goals (6 Weeks):   1. Pt will be able to walk for 10 minutes in regular shoes independently with </=2/10 pain in feet.  2. Pt will be able to ascend/descend 3 flights of  stairs with </=2/10 pain without UE support.  3. Pt will score >/=65/80 on LEFS.    Previous Short Term Goals Status:   Met  New Short Term Goals Status:   1. Pt will demonstrate normalized gait without AD x 300 feet.  Long Term Goal Status:   continue per initial plan of care.  Reasons for Recertification of Therapy:   Pt continues to demonstrate weakness in the R ankle and decreased AROM in the ankles. His pain in B feet continues to cause an antalgic limp and he is still unable to wear regular footwear.     Certification Period: 5/1/17 to 6/12/17  Recommended Treatment Plan: 3 times per week for 6 weeks: Electrical Stimulation IFC for pain control, Russian stim for NMR, Gait Training, Manual Therapy, Moist Heat/ Ice, Neuromuscular Re-ed, Therapeutic Activites, Therapeutic Exercise and Ultrasound/Phonophoresis  Other Recommendations: NA        Therapist's Name: Carissa Adam PT, DPT, MTC   Date: 05/01/2017    I CERTIFY THE NEED FOR THESE SERVICES FURNISHED UNDER THIS PLAN OF TREATMENT AND WHILE UNDER MY CARE    Physician's comments: ________________________________________________________________________________________________________________________________________________      Physician's Name: ___________________________________

## 2017-05-03 ENCOUNTER — CLINICAL SUPPORT (OUTPATIENT)
Dept: REHABILITATION | Facility: HOSPITAL | Age: 42
End: 2017-05-03
Attending: ORTHOPAEDIC SURGERY
Payer: OTHER MISCELLANEOUS

## 2017-05-03 PROCEDURE — 97010 HOT OR COLD PACKS THERAPY: CPT | Mod: PN

## 2017-05-03 PROCEDURE — 97110 THERAPEUTIC EXERCISES: CPT | Mod: PN

## 2017-05-03 NOTE — PROGRESS NOTES
"Name: Kiel GARCIA [facility] Number: [id number]  Date of Treatment: 05/03/2017   Diagnosis: No diagnosis found.     Time in: 1100  Time Out: 1200   Total Treatment Time: 60 min     Subjective:  Pt agreeable to physical therapy today with 5-6/10 pain on his R heel and 2-3/10 pain on his L heel with 0 being the least and 10 being the worst pain imaginable. Pt reports that he has been somewhat compliant with his HEP.       Objective    Patient received individual therapy to increase strength, endurance, ROM, flexibility, posture and core stabilization with activities as follows:     Kiel received therapeutic exercises to develop strength, endurance, ROM, flexibility, posture and core stabilization for 50 minutes including:     Bike L4 x 10 min   Gastroc stretch on 1/2 roll 3 x 30" (B)  Ankle 4 way GTB LLE, RTB LLE x 30   Heel raises with BUE on // bars 2 x 30   Seated DF (AROM) 2 x 30   AROM circles CW/CCW x 30   Toe cruncher 2 x 30 ea  Shuttle Leg Press 2 x 30     (B) 62#    (L) 62#   (R) 43#    Kiel received cryotherapy to B heels after being cleared for contraindications. Pt received this x 10 min sitting up in a chair. Pt with no adverse effect to treatment.   Written Home Exercises Provided: Continue with current HEP. Patient verbalized understanding.       Assessment:   Pt tolerated treatment well. He was progressed on various exercises to continue to build strength and progress him towards his goals. The patient would continue to benefit from skilled PT to address the deficits in his plan of acre.     Patient is making good progress towards established goals.    New/Revised goals: Refer to POC.       Plan:  Continue with established Plan of Care towards PT goals.   "

## 2017-05-05 ENCOUNTER — CLINICAL SUPPORT (OUTPATIENT)
Dept: REHABILITATION | Facility: HOSPITAL | Age: 42
End: 2017-05-05
Attending: ORTHOPAEDIC SURGERY
Payer: OTHER MISCELLANEOUS

## 2017-05-05 DIAGNOSIS — S92.001D BILATERAL CALCANEAL FRACTURES, WITH ROUTINE HEALING, SUBSEQUENT ENCOUNTER: ICD-10-CM

## 2017-05-05 DIAGNOSIS — S92.002D BILATERAL CALCANEAL FRACTURES, WITH ROUTINE HEALING, SUBSEQUENT ENCOUNTER: ICD-10-CM

## 2017-05-05 PROCEDURE — 97110 THERAPEUTIC EXERCISES: CPT | Mod: PN

## 2017-05-05 PROCEDURE — 97150 GROUP THERAPEUTIC PROCEDURES: CPT | Mod: PN

## 2017-05-05 NOTE — PROGRESS NOTES
"Name: Kiel GARCIA [facility] Number: [id number]  Date of Treatment: 05/05/2017   Diagnosis:   Encounter Diagnosis   Name Primary?    Bilateral calcaneal fractures, with routine healing, subsequent encounter        Time in: 1103  Time Out: 1203  Total Treatment Time: 57  Group Time: 27      Subjective:    Kiel reports "I don't do much of anything" when asked if he is having any problems at home.  Patient reports their pain to be 5-6/10 on R LE and 2-3/10 on L LE on a 0-10 scale with 0 being no pain and 10 being the worst pain imaginable.    Objective    Kiel received individual therapy x 30 minutes, group therapy with one other pt x 27 minutes to develop strength, endurance and ROM for  minutes including:     Bike level 4.5 10'  Standing gastroc stretch on 1/2 roll in // bars 3x30"  Standing HR/TR in // bars 3x10  Standing SLS in // bars R/L 3x30" each  Standing MFT board in // bars DF/PF bilateral 3x10  Shuttle leg press 2x30: bilateral 62#, L LE 62#, R LE 43#  Ankle 4-way: L LE GTB 30x each, R LE RTB 30x each  Step ups with 6" step in // bars: R up L down 30x; L up R down 2x10    Written Home Exercises Provided: Continue HEP  Pt demo good understanding of the education provided. Kiel demonstrated good return demonstration of activities.     Assessment:       Pt will continue to benefit from skilled PT intervention. Medical Necessity is demonstrated by:  Pain limits function of effected part for some activities and Unable to participate fully in daily activities.    Patient is making good progress towards established goals.    Pt required V/C to prevent rocking torso on MFT board, and V/C to reduce speed on shuttle. Pt had R LE pain with ankle eversion using RTB, which subsided ~2-3 seconds after stopping exercise. During step ups, L LE circumducted to meet R, which corrected itself after ~4 step ups. Pt has decreased step length with decreased heel strike and toe off, and decreased hip flexion and " extension.    Plan:  Continue with established Plan of Care towards PT goals.   I certify that I was present in the room directing the student in service delivery and guiding them using my skilled judgment. As the co-signing therapist I have reviewed the students documentation and am responsible for the treatment, assessment, and plan.

## 2017-05-08 ENCOUNTER — CLINICAL SUPPORT (OUTPATIENT)
Dept: REHABILITATION | Facility: HOSPITAL | Age: 42
End: 2017-05-08
Attending: ORTHOPAEDIC SURGERY
Payer: OTHER MISCELLANEOUS

## 2017-05-08 DIAGNOSIS — S92.002D BILATERAL CALCANEAL FRACTURES, WITH ROUTINE HEALING, SUBSEQUENT ENCOUNTER: ICD-10-CM

## 2017-05-08 DIAGNOSIS — S92.001D BILATERAL CALCANEAL FRACTURES, WITH ROUTINE HEALING, SUBSEQUENT ENCOUNTER: ICD-10-CM

## 2017-05-08 PROCEDURE — 97110 THERAPEUTIC EXERCISES: CPT | Mod: PN

## 2017-05-08 NOTE — PROGRESS NOTES
"Name: Kiel GARCIA [facility] Number: [id number]  Date of Treatment: 05/08/2017   Diagnosis:   Encounter Diagnosis   Name Primary?    Bilateral calcaneal fractures, with routine healing, subsequent encounter        Time in: 1100  Time Out: 1155  Total Treatment Time: 55      Subjective:    Kiel reports "not much change, certain movements cause increase in pain".  Patient reports their pain to be 6/10 on a 0-10 scale with 0 being no pain and 10 being the worst pain imaginable, in R foot, 3/10 in L LE    Objective  Kiel received therapeutic exercises to develop strength, endurance, ROM and flexibility for 55 minutes including:   Bike level 4.5 10'   Standing gastroc stretch on 1/2 roll in // bars 3x30"   Standing HR/TR in // bars 3x10   Standing SLS in // bars R/L 3x30" each   Standing MFT board in // bars DF/PF, L/R bilateral 3x10   Shuttle leg press 2x30: bilateral 62#, L LE 62#, R LE 43#   Step ups with 6" step in // bars: R up L down 30x; L up R down 2x10    Written Home Exercises Provided: Cont with HEP; Theraband activities at home, patient has GTB, distributed RTB  Pt demo good understanding of the education provided. Kiel demonstrated good return demonstration of activities.     Assessment:       Pt will continue to benefit from skilled PT intervention. Medical Necessity is demonstrated by:  Continued inability to participate in vocational pursuits, Pain limits function of effected part for some activities, Unable to participate fully in daily activities, Requires skilled supervision to complete and progress HEP and Weakness.    Patient is making good progress towards established goals.    Plan:  Continue with established Plan of Care towards PT goals.   "

## 2017-05-10 ENCOUNTER — CLINICAL SUPPORT (OUTPATIENT)
Dept: REHABILITATION | Facility: HOSPITAL | Age: 42
End: 2017-05-10
Attending: ORTHOPAEDIC SURGERY
Payer: OTHER MISCELLANEOUS

## 2017-05-10 ENCOUNTER — TELEPHONE (OUTPATIENT)
Dept: ORTHOPEDICS | Facility: CLINIC | Age: 42
End: 2017-05-10

## 2017-05-10 DIAGNOSIS — S92.002D BILATERAL CALCANEAL FRACTURES, WITH ROUTINE HEALING, SUBSEQUENT ENCOUNTER: ICD-10-CM

## 2017-05-10 DIAGNOSIS — S92.001D BILATERAL CALCANEAL FRACTURES, WITH ROUTINE HEALING, SUBSEQUENT ENCOUNTER: ICD-10-CM

## 2017-05-10 PROCEDURE — 97110 THERAPEUTIC EXERCISES: CPT | Mod: PN | Performed by: PHYSICAL THERAPIST

## 2017-05-10 NOTE — PROGRESS NOTES
"Name: Kiel GARCIA [facility] Number: [id number]  Date of Treatment: 05/10/2017   Diagnosis:   Encounter Diagnosis   Name Primary?    Bilateral calcaneal fractures, with routine healing, subsequent encounter        Time in: 1000  Time Out: 1102  Total Treatment Time: 62  Group Time: 0      Subjective:    Kiel reports his feet are feeling "about the same".  Patient reports their pain to be 2-3/10 L foot and 5-6/10 R foot/10 on a 0-10 scale with 0 being no pain and 10 being the worst pain imaginable.    Objective    Patient received individual therapy to increase strength, endurance, ROM and flexibility with 0 patients with activities as follows:     Kiel received therapeutic exercises to develop strength, endurance, ROM and flexibility for 62 minutes including:     Bike, L5, 10 min  Gastroc st on 1/2 roll, B, 3x30s  HR x 30  TR x 30  Step ups, 6" box, 30x L/R  [place] , 1/2 bag with each foot  Ankle 4way, L/R, GTB, 30x  Shuttle: B 75# 30x, R 62# 30x, L 75# 30x      Assessment:   Pt continues to demonstrate antalgic limp and foot flat contact during gait due to persistent pain in the heels.     Pt will continue to benefit from skilled PT intervention. Medical Necessity is demonstrated by:  Continued inability to participate in vocational pursuits, Pain limits function of effected part for some activities, Unable to participate fully in daily activities and Weakness.    Patient is making good progress towards established goals.    New/Revised goals: NA      Plan:  Continue with established Plan of Care towards PT goals.   "

## 2017-05-10 NOTE — TELEPHONE ENCOUNTER
Spoke to Raegan and advised that we do not perform random drug testing in the clinic. Raegan stated understanding.

## 2017-05-10 NOTE — TELEPHONE ENCOUNTER
----- Message from Mireille Devi sent at 5/10/2017  3:56 PM CDT -----  [facility]/ asking to coordinate a drug screen for workers comp aig ... Asking if they can send the kit for the dr  ? Call [phone] ext Irving / Raegan

## 2017-05-15 ENCOUNTER — CLINICAL SUPPORT (OUTPATIENT)
Dept: REHABILITATION | Facility: HOSPITAL | Age: 42
End: 2017-05-15
Attending: ORTHOPAEDIC SURGERY
Payer: OTHER MISCELLANEOUS

## 2017-05-15 DIAGNOSIS — S92.002D BILATERAL CALCANEAL FRACTURES, WITH ROUTINE HEALING, SUBSEQUENT ENCOUNTER: ICD-10-CM

## 2017-05-15 DIAGNOSIS — S92.001D BILATERAL CALCANEAL FRACTURES, WITH ROUTINE HEALING, SUBSEQUENT ENCOUNTER: ICD-10-CM

## 2017-05-15 PROCEDURE — 97010 HOT OR COLD PACKS THERAPY: CPT | Mod: PN

## 2017-05-15 PROCEDURE — 97150 GROUP THERAPEUTIC PROCEDURES: CPT | Mod: PN

## 2017-05-17 ENCOUNTER — CLINICAL SUPPORT (OUTPATIENT)
Dept: REHABILITATION | Facility: HOSPITAL | Age: 42
End: 2017-05-17
Attending: ORTHOPAEDIC SURGERY
Payer: OTHER MISCELLANEOUS

## 2017-05-17 DIAGNOSIS — S92.002D BILATERAL CALCANEAL FRACTURES, WITH ROUTINE HEALING, SUBSEQUENT ENCOUNTER: ICD-10-CM

## 2017-05-17 DIAGNOSIS — S92.001D BILATERAL CALCANEAL FRACTURES, WITH ROUTINE HEALING, SUBSEQUENT ENCOUNTER: ICD-10-CM

## 2017-05-17 PROCEDURE — 97010 HOT OR COLD PACKS THERAPY: CPT | Mod: PN

## 2017-05-17 PROCEDURE — 97110 THERAPEUTIC EXERCISES: CPT | Mod: PN

## 2017-05-17 NOTE — PROGRESS NOTES
"Name: Kiel GARCIA [facility] Number: [id number]  Date of Treatment: 05/17/2017   Diagnosis:   Encounter Diagnosis   Name Primary?    Bilateral calcaneal fractures, with routine healing, subsequent encounter        Time in: 0907  Time Out: 1007  Total Treatment Time: 60  Group Time: 0      Subjective:    Kiel reports continued 6/10 pain R ankle as well as 2-3/10 L ankle on a 0-10 scale with 0 being no pain and 10 being the worst pain imaginable. Patient reports doing HEP "some".    Objective    Kiel received therapeutic exercises to develop strength, endurance, flexibility and balance for 50 minutes including:     Bike 10'  Plantar fascia stretch with calf stretch 3x30" B  Up/Down 6" steps 2x6 with B rails step through  Ankle 4-way x30 Green Tband B  HR/TR Airex x30  SLS Airex 3x30" B  [place] pickup with inversion 1/2 bag B    Cold pack R ankle 10' to decrease soreness    Pt demo good understanding of the education provided. Kiel demonstrated good return demonstration of activities.     Assessment:     Pt will continue to benefit from skilled PT intervention. Medical Necessity is demonstrated by:  Continued inability to participate in vocational pursuits, Pain limits function of effected part for some activities, Unable to participate fully in daily activities, Requires skilled supervision to complete and progress HEP and Weakness.    Patient is making fair progress towards established goals. R ankle soreness with step down.    Plan:    Continue with established Plan of Care towards PT goals.   "

## 2017-05-19 ENCOUNTER — CLINICAL SUPPORT (OUTPATIENT)
Dept: REHABILITATION | Facility: HOSPITAL | Age: 42
End: 2017-05-19
Attending: ORTHOPAEDIC SURGERY
Payer: OTHER MISCELLANEOUS

## 2017-05-19 DIAGNOSIS — S92.002D BILATERAL CALCANEAL FRACTURES, WITH ROUTINE HEALING, SUBSEQUENT ENCOUNTER: ICD-10-CM

## 2017-05-19 DIAGNOSIS — S92.001D BILATERAL CALCANEAL FRACTURES, WITH ROUTINE HEALING, SUBSEQUENT ENCOUNTER: ICD-10-CM

## 2017-05-19 PROCEDURE — 97010 HOT OR COLD PACKS THERAPY: CPT | Mod: PN

## 2017-05-19 PROCEDURE — 97110 THERAPEUTIC EXERCISES: CPT | Mod: PN

## 2017-05-19 NOTE — PROGRESS NOTES
"Name: Kiel GARCIA [facility] Number: [id number]  Date of Treatment: 05/19/2017   Diagnosis:   Encounter Diagnosis   Name Primary?    Bilateral calcaneal fractures, with routine healing, subsequent encounter        Time in: 1100  Time Out: 1205  Total Treatment Time: 65  Group Time: 0      Subjective:    Kiel reports increased soreness 2nd "cortisone shot probably wearing off".  Patient reports their pain to be 6-7/10 on a 0-10 scale with 0 being no pain and 10 being the worst pain imaginable.    Objective    Kiel received therapeutic exercises to develop strength, endurance and flexibility for 55 minutes including:     Bike Lv5 10'  Plantar fascia stretch with calf stretch 3x30" B  HR/TR Airex x30  Mini squat Airex x30  SLS Airex 3x30" B  Shuttle: 75# B 2x30; 75# L x30; 62# R x30  [place] pickup with inversion 1/2 bag B     Cold pack R ankle 10' to decrease soreness    Pt demo good understanding of the education provided. Kiel demonstrated good return demonstration of activities with cues for increased inversion with marbles.     Assessment:     Pt will continue to benefit from skilled PT intervention. Medical Necessity is demonstrated by:  Continued inability to participate in vocational pursuits, Pain limits function of effected part for some activities, Unable to participate fully in daily activities, Requires skilled supervision to complete and progress HEP and Weakness.    Patient is making fair progress towards established goals.    Plan:    Continue with established Plan of Care towards PT goals.   "

## 2017-05-22 ENCOUNTER — CLINICAL SUPPORT (OUTPATIENT)
Dept: REHABILITATION | Facility: HOSPITAL | Age: 42
End: 2017-05-22
Attending: ORTHOPAEDIC SURGERY
Payer: OTHER MISCELLANEOUS

## 2017-05-22 DIAGNOSIS — S92.002D BILATERAL CALCANEAL FRACTURES, WITH ROUTINE HEALING, SUBSEQUENT ENCOUNTER: ICD-10-CM

## 2017-05-22 DIAGNOSIS — S92.001D BILATERAL CALCANEAL FRACTURES, WITH ROUTINE HEALING, SUBSEQUENT ENCOUNTER: ICD-10-CM

## 2017-05-22 PROCEDURE — 97110 THERAPEUTIC EXERCISES: CPT | Mod: PN

## 2017-05-22 PROCEDURE — 97010 HOT OR COLD PACKS THERAPY: CPT | Mod: PN

## 2017-05-22 NOTE — PROGRESS NOTES
"Name: Kiel GARCIA [facility] Number: [id number]  Date of Treatment: 05/22/2017   Diagnosis:   Encounter Diagnosis   Name Primary?    Bilateral calcaneal fractures, with routine healing, subsequent encounter        Time in: 0959  Time Out: 1111  Total Treatment Time: 70    Subjective:    Kiel reports "same pain", R heel 6-7/10, L heel 2-3/10. Wearing B slippers 2* difficulty fitting R 2* swelling. Admits sporadic HEP compliance.    Objective    Patient received individual therapy to increase strength, endurance, ROM and flexibility with activities as follows:     Kiel received therapeutic exercises to develop strength, endurance, ROM, flexibility and posture for 60 minutes including:     Airex HR/TR B 10/3 in // bars  Airex SLS 3/30s L/R in // bars  Airex minisquats 10/3 B without UE support and cues for proper weight shifting  SLS L/R airex 3/30s in // bars  Shuttle B leg press 30 x 2 75#  Shuttle L   Standing gastroc stretches 3.//30s B in // bars over 1/2 foam roll  Step up to 6" L/R 10/3 in // bars  Seated DF stretches 10 x 10" L/R  Bike x 10' L5  Seated marble pickup L/R DF with inv  Seated MFT A-P and M-L L/R only in comfort ranges 10/3    CP applied to the R heel x 10 minutes after being cleared for contraindications.    Continue HEP daily. Reviewed importance of daily HEP compliance for maximal benefit.     Pt demo good understanding of the education provided. Kiel demonstrated good return demonstration of activities.     Assessment:     Moving well through routine without increased in discomfort. Would benefit from daily HEP compliance. Guarded, antalgic gait with decreased stance time R, slightly flexed knees B in stance.     Pt will continue to benefit from skilled PT intervention. Medical Necessity is demonstrated by:  Requires skilled supervision to complete and progress HEP and Weakness.    Patient is making good progress towards established goals.    Plan:    Continue with established Plan of Care " towards PT goals.

## 2017-05-26 ENCOUNTER — CLINICAL SUPPORT (OUTPATIENT)
Dept: REHABILITATION | Facility: HOSPITAL | Age: 42
End: 2017-05-26
Attending: ORTHOPAEDIC SURGERY
Payer: OTHER MISCELLANEOUS

## 2017-05-26 DIAGNOSIS — S92.002D BILATERAL CALCANEAL FRACTURES, WITH ROUTINE HEALING, SUBSEQUENT ENCOUNTER: ICD-10-CM

## 2017-05-26 DIAGNOSIS — S92.001D BILATERAL CALCANEAL FRACTURES, WITH ROUTINE HEALING, SUBSEQUENT ENCOUNTER: ICD-10-CM

## 2017-05-26 PROCEDURE — 97010 HOT OR COLD PACKS THERAPY: CPT | Mod: PN

## 2017-05-26 PROCEDURE — 97110 THERAPEUTIC EXERCISES: CPT | Mod: PN

## 2017-05-26 NOTE — PROGRESS NOTES
"Name: Kiel GARCIA [facility] Number: [id number]  Date of Treatment: 05/26/2017   Diagnosis:   Encounter Diagnosis   Name Primary?    Bilateral calcaneal fractures, with routine healing, subsequent encounter        Time in: 1110  Time Out: 1210  Total Treatment Time: 60    Subjective:    Kiel reports R foot feels "same" at 7/10, L foot has slight increase in discomfort at lateral border at 4/10.     Objective    Patient received individual therapy to increase strength, endurance, ROM and flexibility with activities as follows:     Kiel received therapeutic exercises to develop strength, endurance, ROM and flexibility for 50 minutes including:     Airex HR/TR B 10/3 in // bars  Airex SLS 3/30s L/R in // bars  Airex minisquats 10/3 B without UE support and cues for proper weight shifting  SLS L/R airex 3/30s in // bars  Standing gastroc stretches 3.//30s B in // bars over 1/2 foam roll  Shuttle B LE leg press 81# 30 x 2  Shuttle unilateral leg press L 75#, R 68# 10/3  Seated DF stretches 10 x 10" L/R  Bike x 10' L5  Stairs 6 x 2 trials: 1) alternating with discomfort R LE dorsum of foot 2) Improved with step-to descent leading with R using one rail.   Seated MFT A-P and M-L L/R only in comfort ranges 10/3     CP applied to the B heels x 10 minutes after being cleared for contraindications.     Continue HEP daily. Reviewed importance of daily HEP compliance for maximal benefit.     Continue HEP daily.    Pt demo good understanding of the education provided. Kiel demonstrated good return demonstration of activities.     Assessment:     Antalgic gait with decreased stance R. Unable to properly descend stairs with alternating pattern 2* R dorsum foot pain.    Pt will continue to benefit from skilled PT intervention. Medical Necessity is demonstrated by:  Requires skilled supervision to complete and progress HEP and Weakness.    Patient is making good progress towards established goals.    Plan:    Continue with " established Plan of Care towards PT goals.

## 2017-05-29 ENCOUNTER — CLINICAL SUPPORT (OUTPATIENT)
Dept: REHABILITATION | Facility: HOSPITAL | Age: 42
End: 2017-05-29
Attending: ORTHOPAEDIC SURGERY
Payer: OTHER MISCELLANEOUS

## 2017-05-29 NOTE — PROGRESS NOTES
Pt arrived with report of striking R foot on doorjamb w significant pain. 5th toe bruised and swollen w pt reporting using ice. Spoke w Dr Wu Ovalle's nurse re: situation. Pt aware PT visits on hold until he can be seen by referring MD.

## 2017-06-05 DIAGNOSIS — S92.001D BILATERAL CALCANEAL FRACTURES, WITH ROUTINE HEALING, SUBSEQUENT ENCOUNTER: Primary | ICD-10-CM

## 2017-06-05 DIAGNOSIS — S92.002D BILATERAL CALCANEAL FRACTURES, WITH ROUTINE HEALING, SUBSEQUENT ENCOUNTER: Primary | ICD-10-CM

## 2017-06-08 ENCOUNTER — OFFICE VISIT (OUTPATIENT)
Dept: ORTHOPEDICS | Facility: CLINIC | Age: 42
End: 2017-06-08
Payer: OTHER MISCELLANEOUS

## 2017-06-08 ENCOUNTER — HOSPITAL ENCOUNTER (OUTPATIENT)
Dept: RADIOLOGY | Facility: HOSPITAL | Age: 42
Discharge: HOME | End: 2017-06-08
Attending: ORTHOPAEDIC SURGERY
Payer: OTHER MISCELLANEOUS

## 2017-06-08 VITALS
WEIGHT: 160 LBS | BODY MASS INDEX: 26.66 KG/M2 | HEIGHT: 65 IN | HEART RATE: 77 BPM | DIASTOLIC BLOOD PRESSURE: 76 MMHG | SYSTOLIC BLOOD PRESSURE: 115 MMHG

## 2017-06-08 DIAGNOSIS — S92.001D BILATERAL CALCANEAL FRACTURES, WITH ROUTINE HEALING, SUBSEQUENT ENCOUNTER: ICD-10-CM

## 2017-06-08 DIAGNOSIS — M19.071 OSTEOARTHRITIS OF RIGHT SUBTALAR JOINT: ICD-10-CM

## 2017-06-08 DIAGNOSIS — S92.002D BILATERAL CALCANEAL FRACTURES, WITH ROUTINE HEALING, SUBSEQUENT ENCOUNTER: Primary | ICD-10-CM

## 2017-06-08 DIAGNOSIS — S92.001D BILATERAL CALCANEAL FRACTURES, WITH ROUTINE HEALING, SUBSEQUENT ENCOUNTER: Primary | ICD-10-CM

## 2017-06-08 DIAGNOSIS — S92.002D BILATERAL CALCANEAL FRACTURES, WITH ROUTINE HEALING, SUBSEQUENT ENCOUNTER: ICD-10-CM

## 2017-06-08 PROCEDURE — 73650 X-RAY EXAM OF HEEL: CPT | Mod: 50,TC,PN

## 2017-06-08 PROCEDURE — 99999 PR PBB SHADOW E&M-EST. PATIENT-LVL III: CPT | Mod: PBBFAC,,, | Performed by: ORTHOPAEDIC SURGERY

## 2017-06-08 PROCEDURE — 73650 X-RAY EXAM OF HEEL: CPT | Mod: 26,50,, | Performed by: RADIOLOGY

## 2017-06-08 PROCEDURE — 99214 OFFICE O/P EST MOD 30 MIN: CPT | Mod: S$GLB,,, | Performed by: ORTHOPAEDIC SURGERY

## 2017-06-08 NOTE — PROGRESS NOTES
Subjective:      Patient ID: Kiel Ayala is a 41 y.o. male.    Chief Complaint: Post-op Evaluation of the Right Foot; Post-op Evaluation of the Left Foot; and Post-op Evaluation (s/p ORIF 1/4/17)    HPI: Doing fairly well today s/p open reduction internal fixation Bilateral calcaneus fractures. He rates his pain as 1/10 today on the left and 7/10 on the right.  He is walking in a slippers. He says he feels popping and severe pain on the right with moving the ankle. The injection I did at last visit lasted about 2 weeks.  He says the pain is all over the foot.     Social History     Occupational History    Not on file.     Social History Main Topics    Smoking status: Never Smoker    Smokeless tobacco: Never Used    Alcohol use Yes      Comment: rarely    Drug use: No    Sexual activity: Not on file            Objective:    Ortho Exam     LLE: Neurovascularly intact, incisions well healed, no swelling, sutures are intact.  No signs of infection. No tenderness to palpation at the the fracture site. Sensitivity at the incision site.   Improving range of motion of the ankle and subtalar joints but still decreased from normal.    RLE: Neurovascularly intact, incisions well healed, no swelling, sutures are intact.  No signs of infection.  Incision is now healed.  No tenderness to palpation at the the fracture site.  Very tender at the subtalar joint.  Improving range of motion of the ankle and subtalar joints but still decreased from normal. He is tender at the subtalar joint and diffusely posterolateral  foot.     XRAYS: 3 views of bilateral heels obtained and reviewed today reveal interval progression of  healed calcaneus fractures with good alignment and restoration of height.     Assessment:          s/p open reduction internal fixation Bilateral calcaneus fractures.        Plan:       Continue PT for range of motion and strengthening of the bilateral foot and ankle for 3 weeks.  Weight bearing as  tolerated in regular shoes. I discussed with him that I am concerned about nerve damage on the right and I am referring him to Dr. Dillon for evaluation and treatment. I discussed with him that   he  Still may need subtalar fusion sooner than expected on the right if pain persists but I don't want to do the surgery for him and then he still has persistent pain. I would like to have this evaluated first. F/u after appt with Dr. Dillon.

## 2017-06-09 ENCOUNTER — TELEPHONE (OUTPATIENT)
Dept: REHABILITATION | Facility: HOSPITAL | Age: 42
End: 2017-06-09

## 2017-06-12 ENCOUNTER — TELEPHONE (OUTPATIENT)
Dept: REHABILITATION | Facility: HOSPITAL | Age: 42
End: 2017-06-12

## 2017-07-10 DIAGNOSIS — S92.001D BILATERAL CALCANEAL FRACTURES, WITH ROUTINE HEALING, SUBSEQUENT ENCOUNTER: Primary | ICD-10-CM

## 2017-07-10 DIAGNOSIS — S92.002D BILATERAL CALCANEAL FRACTURES, WITH ROUTINE HEALING, SUBSEQUENT ENCOUNTER: Primary | ICD-10-CM

## 2017-07-13 ENCOUNTER — HOSPITAL ENCOUNTER (OUTPATIENT)
Dept: RADIOLOGY | Facility: HOSPITAL | Age: 42
Discharge: HOME | End: 2017-07-13
Attending: ORTHOPAEDIC SURGERY
Payer: OTHER MISCELLANEOUS

## 2017-07-13 ENCOUNTER — OFFICE VISIT (OUTPATIENT)
Dept: ORTHOPEDICS | Facility: CLINIC | Age: 42
End: 2017-07-13
Payer: OTHER MISCELLANEOUS

## 2017-07-13 VITALS
HEIGHT: 65 IN | WEIGHT: 160 LBS | SYSTOLIC BLOOD PRESSURE: 129 MMHG | DIASTOLIC BLOOD PRESSURE: 75 MMHG | BODY MASS INDEX: 26.66 KG/M2 | HEART RATE: 91 BPM

## 2017-07-13 DIAGNOSIS — S92.002D BILATERAL CALCANEAL FRACTURES, WITH ROUTINE HEALING, SUBSEQUENT ENCOUNTER: ICD-10-CM

## 2017-07-13 DIAGNOSIS — S92.001D BILATERAL CALCANEAL FRACTURES, WITH ROUTINE HEALING, SUBSEQUENT ENCOUNTER: ICD-10-CM

## 2017-07-13 DIAGNOSIS — M19.071 OSTEOARTHRITIS OF RIGHT SUBTALAR JOINT: Primary | ICD-10-CM

## 2017-07-13 PROCEDURE — 73650 X-RAY EXAM OF HEEL: CPT | Mod: 26,LT,, | Performed by: RADIOLOGY

## 2017-07-13 PROCEDURE — 99214 OFFICE O/P EST MOD 30 MIN: CPT | Mod: S$GLB,,, | Performed by: ORTHOPAEDIC SURGERY

## 2017-07-13 PROCEDURE — 99999 PR PBB SHADOW E&M-EST. PATIENT-LVL III: CPT | Mod: PBBFAC,,, | Performed by: ORTHOPAEDIC SURGERY

## 2017-07-13 PROCEDURE — 73650 X-RAY EXAM OF HEEL: CPT | Mod: 50,TC,PN

## 2017-07-13 PROCEDURE — 73650 X-RAY EXAM OF HEEL: CPT | Mod: 26,RT,, | Performed by: RADIOLOGY

## 2017-07-13 NOTE — PROGRESS NOTES
Subjective:      Patient ID: Kiel Ayala is a 41 y.o. male.    Chief Complaint: Post-op Evaluation of the Left Foot (DOS: 1-4-17/ORIF ) and Post-op Evaluation of the Right Foot (DOS: 1-4-17/ORIF )    HPI: Right foot is still very painful. He rates his pain as 8/10 on the right. He saw Dr. Rae for pain management who ordered an EMG but worker's comp never approved it.     Social History     Occupational History    Not on file.     Social History Main Topics    Smoking status: Never Smoker    Smokeless tobacco: Never Used    Alcohol use Yes      Comment: rarely    Drug use: No    Sexual activity: Not on file            Objective:    Ortho Exam     L  RLE: Neurovascularly intact, incisions well healed, no swelling, sutures are intact.  No signs of infection.  Incision is now healed.  No tenderness to palpation at the the fracture site.  Very tender at the subtalar joint.  Improving range of motion of the ankle and subtalar joints but still decreased from normal. He is tender at the subtalar joint and diffusely posterolateral  foot.       Assessment:          s/p open reduction internal fixation Bilateral calcaneus fractures.        Plan:       Weight bearing as tolerated in regular shoes. I referred him to Dr. Dillon for evaluation and treatment but his worker's comp sent him to Dr. Rae. He would like to see Dr. Sanabria so I referred him and reordered his EMG of RLE.  I discussed with him that   he still may need subtalar fusion sooner than expected on the right if pain persists but I don't want to do the surgery for him and then he still has persistent pain. I would like to have the EMG and pain management evaluation first. F/u once Dr. Sanabria has evaluated him and reviewed his EMG.

## 2017-07-26 ENCOUNTER — TELEPHONE (OUTPATIENT)
Dept: ORTHOPEDICS | Facility: CLINIC | Age: 42
End: 2017-07-26

## 2017-07-26 NOTE — TELEPHONE ENCOUNTER
----- Message from Alyssa Grace sent at 7/26/2017 11:54 AM CDT -----  called  status of worker's comp referral..[phone] (home)

## 2017-07-26 NOTE — TELEPHONE ENCOUNTER
Returned pts call, no answer.Left voicemail informing pt the status of the referral in the computer says pending review, however, he needs to contact his workers comp  for further information.

## 2017-09-02 NOTE — PROGRESS NOTES
"Name: Kiel GARCIA [facility] Number: [id number]  Date of Treatment: 05/15/2017   Diagnosis:   Encounter Diagnosis   Name Primary?    Bilateral calcaneal fractures, with routine healing, subsequent encounter        Time in: 1205  Time Out: 1305  Total Treatment Time: 60  Group Time: 50      Subjective:    Kiel reports ambulating quicker, can wear regular shoe on L foot, not R due to increased bony prominence in posterior heel.  Patient reports their pain to be 6/10 on a 0-10 scale with 0 being no pain and 10 being the worst pain imaginable R foot, 2-3/10 L foot    Objective    Patient received group therapy to increase strength, endurance, ROM and flexibility with 1 patients with activities as follows:     Kiel received therapeutic exercises to develop strength, endurance, ROM and flexibility for 50 minutes including:   Bike 10' L5  Gastroc stretch 3/30s B  HR/TR in // bars 3/10  Marbles 1/2 bag each foot   Shuttle 75# 2 x 30 B, 75# L 3 x 10, 62# R 3 x 10  Step ups 6" 30 L/R    CP x 10' to R rear foot    Written Home Exercises Provided: Cont with HEP  Pt demo good understanding of the education provided. Kiel demonstrated good return demonstration of activities.     Assessment:   Patient ambulated into treatment session with increased pace.  Patient with increasing tolerance of ambulation.  Increased bony prominence posterior R heel.    Pt will continue to benefit from skilled PT intervention. Medical Necessity is demonstrated by:  Pain limits function of effected part for some activities, Unable to participate fully in daily activities, Requires skilled supervision to complete and progress HEP and Weakness.    Patient is making good progress towards established goals.    Plan:  Continue with established Plan of Care towards PT goals.   " S/P Injection of Intervertebral Space for Herniated Disc  2004  S/P lumbar fusion  double fusion

## 2017-09-21 ENCOUNTER — TELEPHONE (OUTPATIENT)
Dept: ORTHOPEDICS | Facility: CLINIC | Age: 42
End: 2017-09-21

## 2017-09-21 NOTE — TELEPHONE ENCOUNTER
Spoke with pt, pt requesting an injection in the ankle before he gets his emg done as he is having pain. Informed him per , the injection will mask the emg results and may have false readings. Pt verbalized understanding and stated that was fine.

## 2017-10-19 ENCOUNTER — OFFICE VISIT (OUTPATIENT)
Dept: ORTHOPEDICS | Facility: CLINIC | Age: 42
End: 2017-10-19
Payer: OTHER MISCELLANEOUS

## 2017-10-19 VITALS — BODY MASS INDEX: 26.66 KG/M2 | WEIGHT: 160 LBS | HEIGHT: 65 IN

## 2017-10-19 DIAGNOSIS — S92.001D CLOSED FRACTURE OF BOTH CALCANEI WITH ROUTINE HEALING, SUBSEQUENT ENCOUNTER: ICD-10-CM

## 2017-10-19 DIAGNOSIS — S92.002D CLOSED FRACTURE OF BOTH CALCANEI WITH ROUTINE HEALING, SUBSEQUENT ENCOUNTER: ICD-10-CM

## 2017-10-19 DIAGNOSIS — M19.071 OSTEOARTHRITIS OF RIGHT SUBTALAR JOINT: Primary | ICD-10-CM

## 2017-10-19 DIAGNOSIS — G57.81 SURAL NEUROPATHY, RIGHT: ICD-10-CM

## 2017-10-19 PROCEDURE — 99999 PR PBB SHADOW E&M-EST. PATIENT-LVL II: CPT | Mod: PBBFAC,,, | Performed by: ORTHOPAEDIC SURGERY

## 2017-10-19 PROCEDURE — 99214 OFFICE O/P EST MOD 30 MIN: CPT | Mod: S$GLB,,, | Performed by: ORTHOPAEDIC SURGERY

## 2017-10-23 PROBLEM — G57.81: Status: ACTIVE | Noted: 2017-10-23

## 2017-10-23 NOTE — PROGRESS NOTES
Subjective:      Patient ID: Kiel Ayala is a 42 y.o. male.    Chief Complaint: Pain of the Right Foot (EMG results )    HPI: Right foot is still very painful. He rates his pain as 9/10 on the right. His EMG shows sural neuropathy on the right. The left side is doing pretty well.     Social History     Occupational History    Not on file.     Social History Main Topics    Smoking status: Never Smoker    Smokeless tobacco: Never Used    Alcohol use Yes      Comment: rarely    Drug use: No    Sexual activity: Not on file            Objective:    Ortho Exam     L  RLE: Neurovascularly intact, incisions well healed, no swelling, sutures are intact.  No signs of infection.  Incision is now healed.  No tenderness to palpation at the the fracture site.  Very tender at the subtalar joint. Limited range of motion of the  subtalar joint. Near full range of motion of the ankle.  He is tender at the subtalar joint and diffusely posterolateral  foot. + tinel's at the sural nerve.      Assessment:          s/p open reduction internal fixation Bilateral calcaneus fractures.       Encounter Diagnoses   Name Primary?    Osteoarthritis of right subtalar joint Yes    Closed fracture of both calcanei with routine healing, subsequent encounter     Sural neuropathy, right           Plan:       Weight bearing as tolerated in regular shoes.  I discussed with him that   he would benefit from subtalar fusion on the right and I can explore/perform neurolysis of the sural nerve at that time. I will keep him off of work 2 more weeks to think about surgery. I also discussed with him that I cannot guarantee that he will return to his previous duties of work and that his nerve pain may be permanent. F/u when ready to schedule surgery.

## 2017-10-24 ENCOUNTER — TELEPHONE (OUTPATIENT)
Dept: ORTHOPEDICS | Facility: CLINIC | Age: 42
End: 2017-10-24

## 2017-10-24 NOTE — TELEPHONE ENCOUNTER
Spoke to patient. Schedule an appt with Dr. Washburn on 11/2/17 to sign consents. Pt stated understanding.

## 2017-10-24 NOTE — TELEPHONE ENCOUNTER
----- Message from Lauren Tran sent at 10/24/2017 10:15 AM CDT -----  Contact: Self  Pt is requesting a call back re: scheduling Sx.

## 2017-11-02 ENCOUNTER — OFFICE VISIT (OUTPATIENT)
Dept: ORTHOPEDICS | Facility: CLINIC | Age: 42
End: 2017-11-02
Payer: OTHER MISCELLANEOUS

## 2017-11-02 VITALS
DIASTOLIC BLOOD PRESSURE: 79 MMHG | BODY MASS INDEX: 26.66 KG/M2 | SYSTOLIC BLOOD PRESSURE: 120 MMHG | HEART RATE: 86 BPM | WEIGHT: 160 LBS | HEIGHT: 65 IN

## 2017-11-02 DIAGNOSIS — G57.81 SURAL NEUROPATHY, RIGHT: ICD-10-CM

## 2017-11-02 DIAGNOSIS — M19.071 OSTEOARTHRITIS OF RIGHT SUBTALAR JOINT: Primary | ICD-10-CM

## 2017-11-02 PROCEDURE — 99214 OFFICE O/P EST MOD 30 MIN: CPT | Mod: S$GLB,,, | Performed by: ORTHOPAEDIC SURGERY

## 2017-11-02 PROCEDURE — 99999 PR PBB SHADOW E&M-EST. PATIENT-LVL III: CPT | Mod: PBBFAC,,, | Performed by: ORTHOPAEDIC SURGERY

## 2017-11-02 RX ORDER — CYCLOBENZAPRINE HCL 10 MG
10 TABLET ORAL 3 TIMES DAILY PRN
COMMUNITY
End: 2022-07-24

## 2017-11-02 NOTE — PROGRESS NOTES
Subjective:      Patient ID: Kiel Ayala is a 42 y.o. male.    Chief Complaint: Pain of the Right Foot (Sign sx consents )    HPI: Right foot is still very painful. He rates his pain as 10/10 on the right. His EMG shows sural neuropathy on the right. The left side is doing pretty well.     Social History     Occupational History    Not on file.     Social History Main Topics    Smoking status: Never Smoker    Smokeless tobacco: Never Used    Alcohol use Yes      Comment: rarely    Drug use: No    Sexual activity: Not on file            Objective:    Ortho Exam       RLE: Neurovascularly intact, incisions well healed, no swelling, sutures are intact.  No signs of infection.  Incision is now healed.  No tenderness to palpation at the the fracture site.  Very tender at the subtalar joint. Limited range of motion of the  subtalar joint. Near full range of motion of the ankle.  He is tender at the subtalar joint and diffusely posterolateral  foot. + tinel's at the sural nerve.      Assessment:          s/p open reduction internal fixation Bilateral calcaneus fractures    Right subtalar post-traumatic osteoarthritis   Right sural neuropathy           Plan:       He is ready to schedule surgery for hardware removal,  right subtalar fusion and explore/perform neurolysis of the sural nerve at that time. I have explained the procedure, including indications, risks, and alternatives.  Kiel Ayala gave informed consent and is medically ready for surgery. To [facility] on 10/20/17.

## 2017-11-08 DIAGNOSIS — M19.071 OSTEOARTHRITIS OF RIGHT SUBTALAR JOINT: Primary | ICD-10-CM

## 2017-11-08 DIAGNOSIS — G57.81 SURAL NEUROPATHY, RIGHT: ICD-10-CM

## 2017-11-08 RX ORDER — MUPIROCIN 20 MG/G
OINTMENT TOPICAL
Status: CANCELLED | OUTPATIENT
Start: 2017-11-08

## 2017-11-14 ENCOUNTER — TELEPHONE (OUTPATIENT)
Dept: ORTHOPEDICS | Facility: CLINIC | Age: 42
End: 2017-11-14

## 2017-11-14 DIAGNOSIS — M79.671 RIGHT FOOT PAIN: Primary | ICD-10-CM

## 2017-11-14 NOTE — TELEPHONE ENCOUNTER
Physician requested clinic notes, therapy notes to be faxed to him before 5pm today. Will fax requested documents.

## 2017-11-14 NOTE — TELEPHONE ENCOUNTER
----- Message from Amada Reed sent at 11/14/2017  2:59 PM CST -----  Contact: Dr. Dumont ? // [phone]  Dr. Dumont left a voice mail message today at 2:30 regarding a peer to peer for patient.  His last name was unintelligible.  Please call.

## 2017-11-17 ENCOUNTER — ANESTHESIA EVENT (OUTPATIENT)
Dept: SURGERY | Facility: HOSPITAL | Age: 42
End: 2017-11-17
Payer: OTHER MISCELLANEOUS

## 2017-11-20 ENCOUNTER — HOSPITAL ENCOUNTER (OUTPATIENT)
Facility: HOSPITAL | Age: 42
Discharge: HOME | End: 2017-11-20
Attending: ORTHOPAEDIC SURGERY | Admitting: ORTHOPAEDIC SURGERY
Payer: OTHER MISCELLANEOUS

## 2017-11-20 ENCOUNTER — HOSPITAL ENCOUNTER (OUTPATIENT)
Dept: RADIOLOGY | Facility: HOSPITAL | Age: 42
Discharge: HOME | End: 2017-11-20
Attending: ORTHOPAEDIC SURGERY | Admitting: ORTHOPAEDIC SURGERY
Payer: OTHER MISCELLANEOUS

## 2017-11-20 ENCOUNTER — ANESTHESIA (OUTPATIENT)
Dept: SURGERY | Facility: HOSPITAL | Age: 42
End: 2017-11-20
Payer: OTHER MISCELLANEOUS

## 2017-11-20 VITALS
OXYGEN SATURATION: 98 % | HEART RATE: 78 BPM | DIASTOLIC BLOOD PRESSURE: 72 MMHG | TEMPERATURE: 97 F | HEIGHT: 65 IN | SYSTOLIC BLOOD PRESSURE: 106 MMHG | BODY MASS INDEX: 26.66 KG/M2 | RESPIRATION RATE: 14 BRPM | WEIGHT: 160 LBS

## 2017-11-20 DIAGNOSIS — G57.81 SURAL NEUROPATHY, RIGHT: ICD-10-CM

## 2017-11-20 DIAGNOSIS — M79.671 RIGHT FOOT PAIN: ICD-10-CM

## 2017-11-20 DIAGNOSIS — M19.071 OSTEOARTHRITIS OF RIGHT SUBTALAR JOINT: Primary | ICD-10-CM

## 2017-11-20 PROCEDURE — 36000709 HC OR TIME LEV III EA ADD 15 MIN: Mod: PO | Performed by: ORTHOPAEDIC SURGERY

## 2017-11-20 PROCEDURE — 64446 NJX AA&/STRD SC NRV NFS IMG: CPT | Mod: PO | Performed by: ANESTHESIOLOGY

## 2017-11-20 PROCEDURE — 64447 NJX AA&/STRD FEMORAL NRV IMG: CPT | Mod: 59,RT,, | Performed by: ANESTHESIOLOGY

## 2017-11-20 PROCEDURE — 27201423 OPTIME MED/SURG SUP & DEVICES STERILE SUPPLY: Mod: PO | Performed by: ORTHOPAEDIC SURGERY

## 2017-11-20 PROCEDURE — 36000708 HC OR TIME LEV III 1ST 15 MIN: Mod: PO | Performed by: ORTHOPAEDIC SURGERY

## 2017-11-20 PROCEDURE — C1713 ANCHOR/SCREW BN/BN,TIS/BN: HCPCS | Mod: PO | Performed by: ORTHOPAEDIC SURGERY

## 2017-11-20 PROCEDURE — 76000 FLUOROSCOPY <1 HR PHYS/QHP: CPT | Mod: TC,PO

## 2017-11-20 PROCEDURE — 63600175 PHARM REV CODE 636 W HCPCS: Mod: PO | Performed by: ANESTHESIOLOGY

## 2017-11-20 PROCEDURE — 25000003 PHARM REV CODE 250: Mod: PO | Performed by: ORTHOPAEDIC SURGERY

## 2017-11-20 PROCEDURE — 63600175 PHARM REV CODE 636 W HCPCS: Mod: PO | Performed by: ORTHOPAEDIC SURGERY

## 2017-11-20 PROCEDURE — 27800903 OPTIME MED/SURG SUP & DEVICES OTHER IMPLANTS: Mod: PO | Performed by: ORTHOPAEDIC SURGERY

## 2017-11-20 PROCEDURE — 64782 REMOVE LIMB NERVE LESION: CPT | Mod: 51,RT,, | Performed by: ORTHOPAEDIC SURGERY

## 2017-11-20 PROCEDURE — 64450 NJX AA&/STRD OTHER PN/BRANCH: CPT | Mod: 59,RT,, | Performed by: ANESTHESIOLOGY

## 2017-11-20 PROCEDURE — 28725 ARTHRODESIS SUBTALAR: CPT | Mod: RT,,, | Performed by: ORTHOPAEDIC SURGERY

## 2017-11-20 PROCEDURE — 37000009 HC ANESTHESIA EA ADD 15 MINS: Mod: PO | Performed by: ORTHOPAEDIC SURGERY

## 2017-11-20 PROCEDURE — 20680 REMOVAL OF IMPLANT DEEP: CPT | Mod: 51,RT,, | Performed by: ORTHOPAEDIC SURGERY

## 2017-11-20 PROCEDURE — C1769 GUIDE WIRE: HCPCS | Mod: PO | Performed by: ORTHOPAEDIC SURGERY

## 2017-11-20 PROCEDURE — 25000003 PHARM REV CODE 250: Mod: PO | Performed by: ANESTHESIOLOGY

## 2017-11-20 PROCEDURE — 76942 ECHO GUIDE FOR BIOPSY: CPT | Mod: 26,,, | Performed by: ANESTHESIOLOGY

## 2017-11-20 PROCEDURE — 63600175 PHARM REV CODE 636 W HCPCS: Mod: PO | Performed by: NURSE ANESTHETIST, CERTIFIED REGISTERED

## 2017-11-20 PROCEDURE — C1729 CATH, DRAINAGE: HCPCS | Mod: PO | Performed by: ORTHOPAEDIC SURGERY

## 2017-11-20 PROCEDURE — 71000033 HC RECOVERY, INTIAL HOUR: Mod: PO | Performed by: ORTHOPAEDIC SURGERY

## 2017-11-20 PROCEDURE — D9220A PRA ANESTHESIA: Mod: ,,, | Performed by: ANESTHESIOLOGY

## 2017-11-20 PROCEDURE — 71000039 HC RECOVERY, EACH ADD'L HOUR: Mod: PO | Performed by: ORTHOPAEDIC SURGERY

## 2017-11-20 PROCEDURE — 25000003 PHARM REV CODE 250: Mod: PO | Performed by: NURSE ANESTHETIST, CERTIFIED REGISTERED

## 2017-11-20 PROCEDURE — 37000008 HC ANESTHESIA 1ST 15 MINUTES: Mod: PO | Performed by: ORTHOPAEDIC SURGERY

## 2017-11-20 DEVICE — IMPLANTABLE DEVICE: Type: IMPLANTABLE DEVICE | Site: FOOT | Status: FUNCTIONAL

## 2017-11-20 DEVICE — MATRIX BIOCARTLIDGE 1ML: Type: IMPLANTABLE DEVICE | Site: FOOT | Status: FUNCTIONAL

## 2017-11-20 RX ORDER — FENTANYL CITRATE 50 UG/ML
25 INJECTION, SOLUTION INTRAMUSCULAR; INTRAVENOUS EVERY 5 MIN PRN
Status: DISCONTINUED | OUTPATIENT
Start: 2017-11-20 | End: 2017-11-20 | Stop reason: HOSPADM

## 2017-11-20 RX ORDER — SODIUM CHLORIDE, SODIUM LACTATE, POTASSIUM CHLORIDE, CALCIUM CHLORIDE 600; 310; 30; 20 MG/100ML; MG/100ML; MG/100ML; MG/100ML
1000 INJECTION, SOLUTION INTRAVENOUS ONCE
Status: DISCONTINUED | OUTPATIENT
Start: 2017-11-20 | End: 2017-11-20 | Stop reason: HOSPADM

## 2017-11-20 RX ORDER — ONDANSETRON 4 MG/1
8 TABLET, ORALLY DISINTEGRATING ORAL EVERY 8 HOURS PRN
Qty: 20 TABLET | Refills: 1 | Status: SHIPPED | OUTPATIENT
Start: 2017-11-20 | End: 2017-12-21

## 2017-11-20 RX ORDER — MIDAZOLAM HYDROCHLORIDE 5 MG/ML
2 INJECTION INTRAMUSCULAR; INTRAVENOUS ONCE
Status: COMPLETED | OUTPATIENT
Start: 2017-11-20 | End: 2017-11-20

## 2017-11-20 RX ORDER — FENTANYL CITRATE 50 UG/ML
50 INJECTION, SOLUTION INTRAMUSCULAR; INTRAVENOUS EVERY 5 MIN PRN
Status: COMPLETED | OUTPATIENT
Start: 2017-11-20 | End: 2017-11-20

## 2017-11-20 RX ORDER — OXYCODONE HYDROCHLORIDE 15 MG/1
15 TABLET ORAL EVERY 4 HOURS PRN
Qty: 42 TABLET | Refills: 0 | Status: SHIPPED | OUTPATIENT
Start: 2017-11-20 | End: 2017-11-27 | Stop reason: SDUPTHER

## 2017-11-20 RX ORDER — PROPOFOL 10 MG/ML
VIAL (ML) INTRAVENOUS
Status: DISCONTINUED | OUTPATIENT
Start: 2017-11-20 | End: 2017-11-20

## 2017-11-20 RX ORDER — LIDOCAINE HYDROCHLORIDE 10 MG/ML
1 INJECTION, SOLUTION EPIDURAL; INFILTRATION; INTRACAUDAL; PERINEURAL ONCE
Status: COMPLETED | OUTPATIENT
Start: 2017-11-20 | End: 2017-11-20

## 2017-11-20 RX ORDER — MIDAZOLAM HYDROCHLORIDE 1 MG/ML
INJECTION, SOLUTION INTRAMUSCULAR; INTRAVENOUS
Status: DISCONTINUED | OUTPATIENT
Start: 2017-11-20 | End: 2017-11-20

## 2017-11-20 RX ORDER — OXYCODONE HYDROCHLORIDE 5 MG/1
5 TABLET ORAL ONCE AS NEEDED
Status: DISCONTINUED | OUTPATIENT
Start: 2017-11-20 | End: 2017-11-20 | Stop reason: HOSPADM

## 2017-11-20 RX ORDER — LIDOCAINE HCL/PF 100 MG/5ML
SYRINGE (ML) INTRAVENOUS
Status: DISCONTINUED | OUTPATIENT
Start: 2017-11-20 | End: 2017-11-20

## 2017-11-20 RX ORDER — MUPIROCIN 20 MG/G
OINTMENT TOPICAL
Status: DISCONTINUED | OUTPATIENT
Start: 2017-11-20 | End: 2017-11-20 | Stop reason: HOSPADM

## 2017-11-20 RX ORDER — ACETAMINOPHEN 10 MG/ML
INJECTION, SOLUTION INTRAVENOUS
Status: DISCONTINUED | OUTPATIENT
Start: 2017-11-20 | End: 2017-11-20

## 2017-11-20 RX ORDER — ONDANSETRON 2 MG/ML
INJECTION INTRAMUSCULAR; INTRAVENOUS
Status: DISCONTINUED | OUTPATIENT
Start: 2017-11-20 | End: 2017-11-20

## 2017-11-20 RX ORDER — SODIUM CHLORIDE, SODIUM LACTATE, POTASSIUM CHLORIDE, CALCIUM CHLORIDE 600; 310; 30; 20 MG/100ML; MG/100ML; MG/100ML; MG/100ML
INJECTION, SOLUTION INTRAVENOUS CONTINUOUS
Status: DISCONTINUED | OUTPATIENT
Start: 2017-11-20 | End: 2017-11-20 | Stop reason: HOSPADM

## 2017-11-20 RX ORDER — ONDANSETRON 2 MG/ML
4 INJECTION INTRAMUSCULAR; INTRAVENOUS ONCE AS NEEDED
Status: DISCONTINUED | OUTPATIENT
Start: 2017-11-20 | End: 2017-11-20 | Stop reason: HOSPADM

## 2017-11-20 RX ORDER — FENTANYL CITRATE 50 UG/ML
INJECTION, SOLUTION INTRAMUSCULAR; INTRAVENOUS
Status: DISCONTINUED | OUTPATIENT
Start: 2017-11-20 | End: 2017-11-20

## 2017-11-20 RX ORDER — ROCURONIUM BROMIDE 10 MG/ML
INJECTION, SOLUTION INTRAVENOUS
Status: DISCONTINUED | OUTPATIENT
Start: 2017-11-20 | End: 2017-11-20

## 2017-11-20 RX ADMIN — MIDAZOLAM HYDROCHLORIDE 2 MG: 5 INJECTION, SOLUTION INTRAMUSCULAR; INTRAVENOUS at 07:11

## 2017-11-20 RX ADMIN — FENTANYL CITRATE 100 MCG: 50 INJECTION, SOLUTION INTRAMUSCULAR; INTRAVENOUS at 08:11

## 2017-11-20 RX ADMIN — LIDOCAINE HYDROCHLORIDE 75 MG: 20 INJECTION PARENTERAL at 08:11

## 2017-11-20 RX ADMIN — MUPIROCIN: 20 OINTMENT TOPICAL at 06:11

## 2017-11-20 RX ADMIN — PROPOFOL 150 MG: 10 INJECTION, EMULSION INTRAVENOUS at 08:11

## 2017-11-20 RX ADMIN — FENTANYL CITRATE 50 MCG: 50 INJECTION INTRAMUSCULAR; INTRAVENOUS at 07:11

## 2017-11-20 RX ADMIN — DEXTROSE 2 G: 50 INJECTION, SOLUTION INTRAVENOUS at 08:11

## 2017-11-20 RX ADMIN — MIDAZOLAM HYDROCHLORIDE 2 MG: 1 INJECTION, SOLUTION INTRAMUSCULAR; INTRAVENOUS at 08:11

## 2017-11-20 RX ADMIN — ONDANSETRON 4 MG: 2 INJECTION, SOLUTION INTRAMUSCULAR; INTRAVENOUS at 09:11

## 2017-11-20 RX ADMIN — SODIUM CHLORIDE, SODIUM LACTATE, POTASSIUM CHLORIDE, CALCIUM CHLORIDE: 600; 310; 30; 20 INJECTION, SOLUTION INTRAVENOUS at 06:11

## 2017-11-20 RX ADMIN — LIDOCAINE HYDROCHLORIDE: 10 INJECTION, SOLUTION EPIDURAL; INFILTRATION; INTRACAUDAL; PERINEURAL at 06:11

## 2017-11-20 RX ADMIN — ACETAMINOPHEN 1000 MG: 10 INJECTION, SOLUTION INTRAVENOUS at 09:11

## 2017-11-20 RX ADMIN — SODIUM CHLORIDE, SODIUM LACTATE, POTASSIUM CHLORIDE, CALCIUM CHLORIDE: 600; 310; 30; 20 INJECTION, SOLUTION INTRAVENOUS at 08:11

## 2017-11-20 RX ADMIN — ROCURONIUM BROMIDE 30 MG: 10 INJECTION, SOLUTION INTRAVENOUS at 08:11

## 2017-11-20 NOTE — ANESTHESIA POSTPROCEDURE EVALUATION
"Anesthesia Post Evaluation    Patient: Kiel GARCIA Rancatore    Procedure(s) Performed: Procedure(s) (LRB):  REMOVAL-HARDWARE-FOOT (Right)  NEUROLYSIS (sural nerve of foot) (Right)  FUSION-SUBTALAR (Right)    Final Anesthesia Type: general  Patient location during evaluation: PACU  Patient participation: Yes- Able to Participate  Level of consciousness: awake and alert  Post-procedure vital signs: reviewed and stable  Pain management: adequate  Airway patency: patent  PONV status at discharge: No PONV  Anesthetic complications: no      Cardiovascular status: hemodynamically stable  Respiratory status: unassisted and room air  Hydration status: euvolemic  Follow-up not needed.        Visit Vitals  /72   Pulse 78   Temp 36.3 °C (97.3 °F) (Skin)   Resp 14   Ht 5' 5" (1.651 m)   Wt 72.6 kg (160 lb)   SpO2 98%   BMI 26.63 kg/m²       Pain/Salvador Score: Pain Assessment Performed: Yes (11/20/2017 10:34 AM)  Presence of Pain: denies (11/20/2017 11:30 AM)  Pain Rating Prior to Med Admin: 0 (11/20/2017 11:02 AM)  Salvador Score: 10 (11/20/2017 11:30 AM)      "

## 2017-11-20 NOTE — PLAN OF CARE
Vital signs stable. All questions answered. Denies recent fever or illness. Patient states ready for planned procedure.Wife to bedside.

## 2017-11-20 NOTE — DISCHARGE INSTRUCTIONS
FOOT SURGERY  After surgery:    DOS:   Keep leg elevated until first post operative visit   Keep dressing clean and dry DO NOT CHANGE BANDAGES   Advanced diet as tolerated.    Check circulation frequently in toes by pressing down on toenail. Nail should turn white and then pink when released.   Non weight bearing   Resume home medications    DONT:   Do not remove your dressing   Do not get dressing wet.   No driving until released by MD   DO NOT TAKE ADDITIONAL TYLENOL/ACETAMINOPHEN WHILE TAKING NARCOTIC PAIN MEDICATION THAT CONTAINS TYLENOL/ACETAMINOPHEN.    CALL PHYSICIAN FOR:   Pain, burning, or numbness of the toes not relieved by elevation of the leg.   Pale or cold toes; bluish nail beds.   Redness, swelling, or bleeding.   Fever> 101   Drainage (pus) from the puncture sites   Pain unrelieved by pain medication    FOR EMERGENCIES CONTACT YOUR PHYSICIAN [phone]      Discharge Instructions: After Your Surgery  Youve just had surgery. During surgery, you were given medicine called anesthesia to keep you relaxed and free of pain. After surgery, you may have some pain or nausea. This is common. Here are some tips for feeling better and getting well after surgery.     Stay on schedule with your medicine.   Going home  Your healthcare provider will show you how to take care of yourself when you go home. He or she will also answer your questions. Have an adult family member or friend drive you home. For the first 24 hours after your surgery:  · Do not drive or use heavy equipment.  · Do not make important decisions or sign legal papers.  · Do not drink alcohol.  · Have someone stay with you, if needed. He or she can watch for problems and help keep you safe.  Be sure to go to all follow-up visits with your healthcare provider. And rest after your surgery for as long as your healthcare provider tells you to.  Coping with pain  If you have pain after surgery, pain medicine will help you feel  better. Take it as told, before pain becomes severe. Also, ask your healthcare provider or pharmacist about other ways to control pain. This might be with heat, ice, or relaxation. And follow any other instructions your surgeon or nurse gives you.  Tips for taking pain medicine  To get the best relief possible, remember these points:  · Pain medicines can upset your stomach. Taking them with a little food may help.  · Most pain relievers taken by mouth need at least 20 to 30 minutes to start to work.  · Taking medicine on a schedule can help you remember to take it. Try to time your medicine so that you can take it before starting an activity. This might be before you get dressed, go for a walk, or sit down for dinner.  · Constipation is a common side effect of pain medicines. Call your healthcare provider before taking any medicines such as laxatives or stool softeners to help ease constipation. Also ask if you should skip any foods. Drinking lots of fluids and eating foods such as fruits and vegetables that are high in fiber can also help. Remember, do not take laxatives unless your surgeon has prescribed them.  · Drinking alcohol and taking pain medicine can cause dizziness and slow your breathing. It can even be deadly. Do not drink alcohol while taking pain medicine.  · Pain medicine can make you react more slowly to things. Do not drive or run machinery while taking pain medicine.  Your healthcare provider may tell you to take acetaminophen to help ease your pain. Ask him or her how much you are supposed to take each day. Acetaminophen or other pain relievers may interact with your prescription medicines or other over-the-counter (OTC) medicines. Some prescription medicines have acetaminophen and other ingredients. Using both prescription and OTC acetaminophen for pain can cause you to overdose. Read the labels on your OTC medicines with care. This will help you to clearly know the list of ingredients, how  much to take, and any warnings. It may also help you not take too much acetaminophen. If you have questions or do not understand the information, ask your pharmacist or healthcare provider to explain it to you before you take the OTC medicine.  Managing nausea  Some people have an upset stomach after surgery. This is often because of anesthesia, pain, or pain medicine, or the stress of surgery. These tips will help you handle nausea and eat healthy foods as you get better. If you were on a special food plan before surgery, ask your healthcare provider if you should follow it while you get better. These tips may help:  · Do not push yourself to eat. Your body will tell you when to eat and how much.  · Start off with clear liquids and soup. They are easier to digest.  · Next try semi-solid foods, such as mashed potatoes, applesauce, and gelatin, as you feel ready.  · Slowly move to solid foods. Dont eat fatty, rich, or spicy foods at first.  · Do not force yourself to have 3 large meals a day. Instead eat smaller amounts more often.  · Take pain medicines with a small amount of solid food, such as crackers or toast, to avoid nausea.     Call your surgeon if  · You still have pain an hour after taking medicine. The medicine may not be strong enough.  · You feel too sleepy, dizzy, or groggy. The medicine may be too strong.  · You have side effects like nausea, vomiting, or skin changes, such as rash, itching, or hives.       If you have obstructive sleep apnea  You were given anesthesia medicine during surgery to keep you comfortable and free of pain. After surgery, you may have more apnea spells because of this medicine and other medicines you were given. The spells may last longer than usual.   At home:  · Keep using the continuous positive airway pressure (CPAP) device when you sleep. Unless your healthcare provider tells you not to, use it when you sleep, day or night. CPAP is a common device used to treat  obstructive sleep apnea.  · Talk with your provider before taking any pain medicine, muscle relaxants, or sedatives. Your provider will tell you about the possible dangers of taking these medicines.  Date Last Reviewed: 12/1/2016  © 3140-8602 [employer]. [address] [place] [address]. All rights reserved. This information is not intended as a substitute for professional medical care. Always follow your healthcare professional's instructions.

## 2017-11-20 NOTE — OP NOTE
OPERATIVE NOTE      DATE:  11/20/2017   TIME: 10:25 AM      PATIENT NAME: Kiel Ayala      PRE-OPERATIVE DIAGNOSIS: 1) right subtalar joint post-traumatic arthritis s/p comminuted displaced intra-articular calcaneus fracture 2) painful hardware right foot 3) Right sural neuropathy      POST-OPERATIVE DIAGNOSIS: 1) right subtalar joint post-traumatic arthritis s/p comminuted displaced intra-articular calcaneus fracture 2) painful hardware right foot 3) Right sural neuroma     PROCEDURE: 1) Removal of calcaneus plate right foot 2) right subtalar joint fusion 3) Excision of neuroma right sural nerve     SURGEON: Ankur Washburn MD     ANESTHESIA TYPE: GETA      SPECIMENS SENT: NONE      COMPLICATIONS: NONE      BLOOD LOSS: 25 cc      ASSISTANT: CHANDRAKATN Carlson    PROCEDURE IN DETAIL: After appropriate informed consent was obtained, the patient was taken to the OR and place with a bump under the ipsilateral hip. The right lower extremity was prepped and draped in the usual sterile fashion. The tourniquet was raised to 350 mm Hg after esmarch exsanguination of the limb. An approximately 15 cm L-shaped incision was made midway between the Achilles tendon and the lateral border and the posterior border of the fibula, continuing curving distally at the corner of the heel along the glabrous border of the foot. Proximally, the incision was made through the skin and subcutanetous tissue only. This was done in line with the previous scar from his open reduction internal fixation of the calcaneus. Littler scissors were used to dissect through the deep layer in order to protect the sural nerve which was identified and tracked from proximal normal nerve to distal portion of the nerve which was severely scarred down. Just past the tip of the fibula within the skin flap as a neuroma of the sural nerve in continuity was found. The neuroma was resected using bovie cautery and the proximal portion of the nerve was  retracted.      Seine retractors were gently used to retract the skin flap. A #15 blade was used to raise a thick skin flap from the lateral aspect of the calcaneus and distally, this incision was carried to the insertion of the peroneus brevis. The peroneal tendons were removed from their groove and retracted. One 2.0 K-wire was placed in the tip of the fibula and used to retract the skin flap and tendons in order to prevent any undue traction.      The calcaneus plate was exposed using sharp dissection and the plate and screws were completely removed. The fracture was well healed with very good alignment of the joint but there was severe post-traumatic arthritis of the subtalar joint due to the original injury.   Next the subtalar join was decorticated using rongeur, jensen, and curettes. The joint surfaces were feathered using an osteotome. Next, 2.5 cc of Bio4 bone putty, dbx bone putty and bone autograft from the posterior inferior calcaneus was placed into the fusion site.  Next the joint was compressed using two 7.0 [place] partially threaded headless screws. The screws were placed posterior to anterior after insertion can confirmation of placement of the guidewire across the joint. Very good compression was obtained.      Axial and lateral views showed very good alignement and placement of the screw. The tendons were relocated and the sheath was repaired at the level of the groove to prevent subluxation. The tourniquet was let down, adequate hemostasis achieved using bovie cautery. The incision was copiously irrigated using normal saline. A medium hemovac drain was placed. The deep layer was re-approximated using 0-moncryl in an interrupted fashion. The subcutaneous layer as re-approximated using 2.0-moncryl in an interrupted fashion. The skin was re-approximated using using 3.0 nylon in a running fashion for the proximal portion of the incision and the corner and distal portion was the incision using modified  Donati fashion. Sterile dressing using xeroform, bacitracin, 4x8s, cast padding, posterior splint was placed. The patient tolerated the procedure well without complications. I was present and scrubbed for the entire case.   This fusion presented increased difficutly and prolonged operative time due to the abundant amount of scar tissue.

## 2017-11-20 NOTE — PLAN OF CARE
Patient tolerating oral liquids without difficulty. No apparent s&s of distress noted at this time, no complaints voiced at this time. On ball infusing without difficuly. dresing c,d,i to right foot.  Discharge instructions reviewed with patient/family/friend with good verbal feedback received. Patient ready for discharge

## 2017-11-20 NOTE — ANESTHESIA PREPROCEDURE EVALUATION
11/20/2017  Kiel Ayala is a 42 y.o., male.    Anesthesia Evaluation    I have reviewed the Patient Summary Reports.    I have reviewed the Nursing Notes.   I have reviewed the Medications.     Review of Systems  Anesthesia Hx:  No problems with previous Anesthesia    Social:  Non-Smoker    Hematology/Oncology:  Hematology Normal   Oncology Normal     EENT/Dental:EENT/Dental Normal   Cardiovascular:  Cardiovascular Normal     Pulmonary:  Pulmonary Normal    Renal/:  Renal/ Normal     Hepatic/GI:  Hepatic/GI Normal    Musculoskeletal:   Arthritis     Neurological:   Neuromuscular Disease,    Endocrine:  Endocrine Normal    Dermatological:  Skin Normal    Psych:  Psychiatric Normal           Physical Exam  General:  Well nourished    Airway/Jaw/Neck:  Airway Findings: Mouth Opening: Normal Tongue: Normal  General Airway Assessment: Adult  Mallampati: I  TM Distance: Normal, at least 6 cm        Eyes/Ears/Nose:  EYES/EARS/NOSE FINDINGS: Normal   Dental:  DENTAL FINDINGS: Normal   Chest/Lungs:  Chest/Lungs Findings: Clear to auscultation, Normal Respiratory Rate     Heart/Vascular:  Heart Findings: Rate: Normal  Rhythm: Regular Rhythm  Sounds: Normal        Mental Status:  Mental Status Findings: Normal        Anesthesia Plan  Type of Anesthesia, risks & benefits discussed:  Anesthesia Type:  general  Patient's Preference:   Intra-op Monitoring Plan:   Intra-op Monitoring Plan Comments:   Post Op Pain Control Plan: IV/PO Opioids PRN and peripheral nerve block  Post Op Pain Control Plan Comments:   Induction:   IV  Beta Blocker:  Patient is not currently on a Beta-Blocker (No further documentation required).       Informed Consent: Patient understands risks and agrees with Anesthesia plan.  Questions answered. Anesthesia consent signed with patient.  ASA Score: 1     Day of Surgery Review of History &  Physical: I have interviewed and examined the patient. I have reviewed the patient's H&P dated:  There are no significant changes.  H&P update referred to the surgeon.         Ready For Surgery From Anesthesia Perspective.

## 2017-11-20 NOTE — BRIEF OP NOTE
OPERATIVE NOTE      DATE:  11/20/2017   TIME: 10:25 AM      PATIENT NAME: Kiel Ayala      PRE-OPERATIVE DIAGNOSIS: 1) right subtalar joint post-traumatic arthritis s/p comminuted displaced intra-articular calcaneus fracture 2) painful hardware right foot 3) Right sural neuropathy      POST-OPERATIVE DIAGNOSIS: 1) right subtalar joint post-traumatic arthritis s/p comminuted displaced intra-articular calcaneus fracture 2) painful hardware right foot 3) Right sural neuroma     PROCEDURE: 1) Removal of calcaneus plate right foot 2) right subtalar joint fusion 3) Excision of neuroma right sural nerve     SURGEON: Ankur Washburn MD     ANESTHESIA TYPE: GETA      SPECIMENS SENT: NONE      COMPLICATIONS: NONE      BLOOD LOSS: 25 cc      ASSISTANT: CHANDRAKANT Carlson

## 2017-11-20 NOTE — TRANSFER OF CARE
"Anesthesia Transfer of Care Note    Patient: Kiel GARCIA Rancatore    Procedure(s) Performed: Procedure(s) (LRB):  REMOVAL-HARDWARE-FOOT (Right)  NEUROLYSIS (sural nerve of foot) (Right)  FUSION-SUBTALAR (Right)    Patient location: PACU    Anesthesia Type: general    Transport from OR: Transported from OR on room air with adequate spontaneous ventilation    Post pain: adequate analgesia    Post assessment: no apparent anesthetic complications and tolerated procedure well    Post vital signs: stable    Level of consciousness: awake and alert    Nausea/Vomiting: no nausea/vomiting    Complications: none    Transfer of care protocol was followed      Last vitals:   Visit Vitals  /67 (BP Location: Left arm, Patient Position: Lying)   Pulse 105   Temp 36.5 °C (97.7 °F) (Skin)   Resp 14   Ht 5' 5" (1.651 m)   Wt 72.6 kg (160 lb)   SpO2 98%   BMI 26.63 kg/m²     "

## 2017-11-21 ENCOUNTER — TELEPHONE (OUTPATIENT)
Dept: ORTHOPEDICS | Facility: CLINIC | Age: 42
End: 2017-11-21

## 2017-11-21 NOTE — ANESTHESIA POST-OP PAIN MANAGEMENT
Acute Pain Service Progress Note    Kiel Ayala is a 42 y.o., male, [id number].    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       Subjective:     General appearance of alert, oriented, no complaints   Pain with rest: 2    Numbers   Pain with movement: 2    Numbers   Side Effects    1. Pruritis No    2. Nausea No    3. Motor Blockade No, 1=Ability to bend knees and ankles    4. Sedation No, 1=awake and alert    Objective:     Catheter level    Catheter site clean, dry, intact   Catheter placement      Vitals   Vitals:    11/20/17 1130   BP: 106/72   Pulse: 78   Resp: 14   Temp: 36.3 °C (97.3 °F)        Labs    No visits with results within 2 Day(s) from this visit.   Latest known visit with results is:   Admission on 01/04/2017, Discharged on 01/06/2017   Component Date Value Ref Range Status    Lactate (Lactic Acid) 01/06/2017 1.5  0.5 - 2.2 mmol/L Final    WBC 01/06/2017 9.10  3.90 - 12.70 K/uL Final    RBC 01/06/2017 4.16* 4.60 - 6.20 M/uL Final    Hemoglobin 01/06/2017 11.9* 14.0 - 18.0 g/dL Final    Hematocrit 01/06/2017 35.8* 40.0 - 54.0 % Final    MCV 01/06/2017 86  82 - 98 fL Final    MCH 01/06/2017 28.5  27.0 - 31.0 pg Final    MCHC 01/06/2017 33.2  32.0 - 36.0 % Final    RDW 01/06/2017 12.6  11.5 - 14.5 % Final    Platelets 01/06/2017 249  150 - 350 K/uL Final    MPV 01/06/2017 8.1* 9.2 - 12.9 fL Final    Gran # 01/06/2017 6.8  1.8 - 7.7 K/uL Final    Lymph # 01/06/2017 1.1  1.0 - 4.8 K/uL Final    Mono # 01/06/2017 1.2* 0.3 - 1.0 K/uL Final    Eos # 01/06/2017 0.0  0.0 - 0.5 K/uL Final    Baso # 01/06/2017 0.00  0.00 - 0.20 K/uL Final    Gran% 01/06/2017 74.3* 38.0 - 73.0 % Final    Lymph%  01/06/2017 12.4* 18.0 - 48.0 % Final    Mono% 01/06/2017 12.9  4.0 - 15.0 % Final    Eosinophil% 01/06/2017 0.1  0.0 - 8.0 % Final    Basophil% 01/06/2017 0.3  0.0 - 1.9 % Final    Differential Method 01/06/2017 Automated   Final    Sodium 01/06/2017 138  136 - 145 mmol/L Final    Potassium 01/06/2017 3.7  3.5 - 5.1 mmol/L Final    Chloride 01/06/2017 102  95 - 110 mmol/L Final    CO2 01/06/2017 28  23 - 29 mmol/L Final    Glucose 01/06/2017 107  70 - 110 mg/dL Final    BUN, Bld 01/06/2017 9  6 - 20 mg/dL Final    Creatinine 01/06/2017 1.0  0.5 - 1.4 mg/dL Final    Calcium 01/06/2017 9.1  8.7 - 10.5 mg/dL Final    Total Protein 01/06/2017 6.8  6.0 - 8.4 g/dL Final    Albumin 01/06/2017 3.5  3.5 - 5.2 g/dL Final    Total Bilirubin 01/06/2017 0.5  0.1 - 1.0 mg/dL Final    Alkaline Phosphatase 01/06/2017 99  55 - 135 U/L Final    AST 01/06/2017 39  10 - 40 U/L Final    ALT 01/06/2017 27  10 - 44 U/L Final    Anion Gap 01/06/2017 8  8 - 16 mmol/L Final    eGFR if African American 01/06/2017 >60  >60 mL/min/1.73 m^2 Final    eGFR if non African American 01/06/2017 >60  >60 mL/min/1.73 m^2 Final    Blood Culture, Routine 01/11/2017 No growth after 5 days.   Final    Blood Culture, Routine 01/11/2017 No growth after 5 days.   Final    Magnesium 01/06/2017 2.1  1.6 - 2.6 mg/dL Final    Phosphorus 01/06/2017 2.1* 2.7 - 4.5 mg/dL Final        Meds   No current facility-administered medications for this encounter.      Current Outpatient Prescriptions   Medication Sig Dispense Refill    cyclobenzaprine (FLEXERIL) 10 MG tablet Take 10 mg by mouth 3 (three) times daily as needed for Muscle spasms.      gabapentin (NEURONTIN) 300 MG capsule TAKE ONE CAPSULE BY MOUTH TWO TIMES A DAY 60 capsule 1    ondansetron (ZOFRAN-ODT) 4 MG TbDL Take 2 tablets (8 mg total) by mouth every 8 (eight) hours as needed. 20 tablet 1    oxyCODONE (ROXICODONE) 15 MG Tab Take 1 tablet (15 mg total) by mouth every 4 (four)  hours as needed for Pain. 42 tablet 0        Anticoagulant dose  Assessment:     Pain control adequate    Plan:     Patient doing well, continue present treatment.    Evaluator Taj Segal

## 2017-11-21 NOTE — TELEPHONE ENCOUNTER
Spoke to patient. Advised that workers comp denied his surgery that was done yesterday. Workers comp also has denied the knee scooter patient needs to use for ambulation due to being non weight bearing. Patient will call his  to discuss how to proceed. Patient will call back.

## 2017-11-21 NOTE — DISCHARGE SUMMARY
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       Discharged Condition: good    Hospital Course: Patient was admitted for an outpatient procedure and tolerated the procedure well with no complications.    Final Diagnoses: Same as principal problem.    Disposition: Home or Self Care    Follow up/Patient Instructions:    Medications:  Reconciled Home Medications:   Discharge Medication List as of 11/20/2017 11:04 AM      START taking these medications    Details   ondansetron (ZOFRAN-ODT) 4 MG TbDL Take 2 tablets (8 mg total) by mouth every 8 (eight) hours as needed., Starting Mon 11/20/2017, Normal      oxyCODONE (ROXICODONE) 15 MG Tab Take 1 tablet (15 mg total) by mouth every 4 (four) hours as needed for Pain., Starting Mon 11/20/2017, Normal         CONTINUE these medications which have NOT CHANGED    Details   cyclobenzaprine (FLEXERIL) 10 MG tablet Take 10 mg by mouth 3 (three) times daily as needed for Muscle spasms., Historical Med      gabapentin (NEURONTIN) 300 MG capsule TAKE ONE CAPSULE BY MOUTH TWO TIMES A DAY, Normal             Discharge Procedure Orders  CRUTCHES FOR HOME USE   Order Specific Question Answer Comments   Type: Axillary    Height: 5' 5" (1.651 m)    Weight: 72.6 kg (160 lb)    Length of need (1-99 months): 3    Vendor: Other (use comments) [facility]   Expected Date of Delivery: 11/20/2017      Diet general     Call MD for:  temperature >100.4     Call MD for:  persistent nausea and vomiting     Call MD for:  severe uncontrolled pain     Call MD for:  difficulty breathing, headache or visual disturbances     Call MD for: " " redness, tenderness, or signs of infection (pain, swelling, redness, odor or green/yellow discharge around incision site)     Call MD for:  hives     Call MD for:  persistent dizziness or light-headedness     Call MD for:  extreme fatigue     Keep surgical extremity elevated     Non weight bearing     No driving, operating heavy equipment or signing legal documents while taking pain medication     Leave dressing on - Keep it clean, dry, and intact until clinic visit       Follow-up Information     Ankur Washburn MD On 12/7/2017.    Specialty:  Orthopedic Surgery  Contact information:  [address] [place] [address]  [phone]                   Discharge Procedure Orders (must include Diet, Follow-up, Activity):    Discharge Procedure Orders (must include Diet, Follow-up, Activity)  CRUTCHES FOR HOME USE   Order Specific Question Answer Comments   Type: Axillary    Height: 5' 5" (1.651 m)    Weight: 72.6 kg (160 lb)    Length of need (1-99 months): 3    Vendor: Other (use comments) [facility]   Expected Date of Delivery: 11/20/2017      Diet general     Call MD for:  temperature >100.4     Call MD for:  persistent nausea and vomiting     Call MD for:  severe uncontrolled pain     Call MD for:  difficulty breathing, headache or visual disturbances     Call MD for:  redness, tenderness, or signs of infection (pain, swelling, redness, odor or green/yellow discharge around incision site)     Call MD for:  hives     Call MD for:  persistent dizziness or light-headedness     Call MD for:  extreme fatigue     Keep surgical extremity elevated     Non weight bearing     No driving, operating heavy equipment or signing legal documents while taking pain medication     Leave dressing on - Keep it clean, dry, and intact until clinic visit        "

## 2017-11-21 NOTE — TELEPHONE ENCOUNTER
----- Message from Madie Melara sent at 11/21/2017  2:49 PM CST -----  Contact: don nguyen attnorirene Nguyen  for the pt calling to speak to Nurse Ovalle about denial of pt surgery and equipment. Called med legal as nurse ovalle advised on IM and ws told to tel the  to submit a subpoena to legal [email or web address] and someone would set up an appointment to discuss the matter,the  states he doesn't want no records just speak to Nurse Ovalle cause she had call the pt his client and told that Worker comp denied pt surgery and request for medical equipment...[phone](Don Nguyen)

## 2017-11-22 ENCOUNTER — CLINICAL SUPPORT (OUTPATIENT)
Dept: ORTHOPEDICS | Facility: CLINIC | Age: 42
End: 2017-11-22
Payer: OTHER MISCELLANEOUS

## 2017-11-22 NOTE — PROGRESS NOTES
Patient in clinic this morning for drain to right lower extremity to be removed. Drain removed without difficulty. Que ball removed with catheter intact. Patient tolerated both removals well. Patient asked for increase in pain medication. Advised that Dr. Washburn will not increase the pain medicine any further. Patient instructed to keep right lower extremity elevated above the heart. Patient given appointment to follow up in 2 weeks after surgery. Patient stated understanding.

## 2017-11-24 NOTE — ANESTHESIA POST-OP PAIN MANAGEMENT
"Acute Pain Service Progress Note    Kiel Ayala is a 42 y.o., male, [id number].    Surgery:   REMOVAL-HARDWARE-FOOT (Right Foot)      NEUROLYSIS (sural nerve of foot) (Right Foot)      FUSION-SUBTALAR (Right Ankle)    LVM at contact # provided.  According to ortho nursing note from 11/22/17 "Que ball removed with catheter intact. Patient tolerated both removals well."   APS signing off.    Evaluator Ernesto Mcintosh            "

## 2017-11-24 NOTE — ADDENDUM NOTE
Addendum  created 11/24/17 0903 by Ernesto Mcintosh MD    Anesthesia Event edited, Sign clinical note

## 2017-11-27 ENCOUNTER — TELEPHONE (OUTPATIENT)
Dept: ORTHOPEDICS | Facility: CLINIC | Age: 42
End: 2017-11-27

## 2017-11-27 RX ORDER — OXYCODONE HYDROCHLORIDE 15 MG/1
15 TABLET ORAL EVERY 4 HOURS PRN
Qty: 42 TABLET | Refills: 0 | Status: SHIPPED | OUTPATIENT
Start: 2017-11-27 | End: 2017-12-04 | Stop reason: SDUPTHER

## 2017-11-27 NOTE — TELEPHONE ENCOUNTER
Spoke with pt, pt requesting the medication refill to be sent to [facility], [phone].

## 2017-11-27 NOTE — TELEPHONE ENCOUNTER
Spoke with pt, informed him the medication has been sent to the [facility] pharmacy in [place].Pt stated that was fine.

## 2017-11-27 NOTE — TELEPHONE ENCOUNTER
Called to advise that Dr. Washburn is in the OR today. She will address refills at the end of the day. Patient stated understanding.

## 2017-11-27 NOTE — TELEPHONE ENCOUNTER
----- Message from Chelsie Wyatt sent at 11/27/2017 10:47 AM CST -----  Contact: Patient  Kiel, patient [phone] calling for a prescription refill on oxyCODONE (ROXICODONE) 15 MG Tab. Please advise. Thanks.    [id number]   [address] [facility] [address]  Phone: [phone] Fax: [phone]

## 2017-11-27 NOTE — TELEPHONE ENCOUNTER
----- Message from Ursula Sahni sent at 11/27/2017  4:27 PM CST -----  Contact: patient  Patient calling to speak back with the Nurse about getting his prescription for Oxycodone 15 mg. The Pharmacy closes at 5:30 and he wants to make sure it will be called in. Please advise. Call to pod. No answer.   Call back  Thanks!  [id number] [place] [address] [facility]  [address] [facility] [address]  Phone: [phone] Fax: [phone]

## 2017-11-27 NOTE — TELEPHONE ENCOUNTER
----- Message from Madie Melara sent at 11/27/2017 12:38 PM CST -----  Contact: pt  Pt needs a Rx refilled. Rx-oxyCODONE (ROXICODONE) 15 MG Tab. Pt is completely out...[phone] (please notify when sent)          .  [id number] - [place], [place] - [address] [address]  Phone: [phone] Fax: [phone]

## 2017-12-04 ENCOUNTER — TELEPHONE (OUTPATIENT)
Dept: ORTHOPEDICS | Facility: CLINIC | Age: 42
End: 2017-12-04

## 2017-12-04 RX ORDER — OXYCODONE HYDROCHLORIDE 15 MG/1
15 TABLET ORAL EVERY 4 HOURS PRN
Qty: 42 TABLET | Refills: 0 | Status: SHIPPED | OUTPATIENT
Start: 2017-12-04 | End: 2017-12-11 | Stop reason: SDUPTHER

## 2017-12-04 NOTE — TELEPHONE ENCOUNTER
----- Message from Amada Reed sent at 12/4/2017 10:58 AM CST -----  Contact: Self  Calling for a refill on Oxycodone 15mg.

## 2017-12-04 NOTE — TELEPHONE ENCOUNTER
Spoke with pt, informed him the medication refill has been [facility] to the [facility].Pt verbalized understanding and stated that was fine.

## 2017-12-06 DIAGNOSIS — M19.071 OSTEOARTHRITIS OF RIGHT SUBTALAR JOINT: Primary | ICD-10-CM

## 2017-12-07 ENCOUNTER — OFFICE VISIT (OUTPATIENT)
Dept: ORTHOPEDICS | Facility: CLINIC | Age: 42
End: 2017-12-07
Payer: OTHER MISCELLANEOUS

## 2017-12-07 ENCOUNTER — HOSPITAL ENCOUNTER (OUTPATIENT)
Dept: RADIOLOGY | Facility: HOSPITAL | Age: 42
Discharge: HOME | End: 2017-12-07
Attending: ORTHOPAEDIC SURGERY
Payer: OTHER MISCELLANEOUS

## 2017-12-07 VITALS
DIASTOLIC BLOOD PRESSURE: 68 MMHG | HEART RATE: 95 BPM | WEIGHT: 160 LBS | SYSTOLIC BLOOD PRESSURE: 110 MMHG | BODY MASS INDEX: 26.66 KG/M2 | HEIGHT: 65 IN

## 2017-12-07 DIAGNOSIS — M19.071 OSTEOARTHRITIS OF RIGHT SUBTALAR JOINT: ICD-10-CM

## 2017-12-07 DIAGNOSIS — G57.81 SURAL NEUROPATHY, RIGHT: Primary | ICD-10-CM

## 2017-12-07 PROCEDURE — 73650 X-RAY EXAM OF HEEL: CPT | Mod: TC,PN,RT

## 2017-12-07 PROCEDURE — 99024 POSTOP FOLLOW-UP VISIT: CPT | Mod: S$GLB,,, | Performed by: ORTHOPAEDIC SURGERY

## 2017-12-07 PROCEDURE — 99999 PR PBB SHADOW E&M-EST. PATIENT-LVL III: CPT | Mod: PBBFAC,,, | Performed by: ORTHOPAEDIC SURGERY

## 2017-12-07 PROCEDURE — 73650 X-RAY EXAM OF HEEL: CPT | Mod: 26,RT,, | Performed by: RADIOLOGY

## 2017-12-07 PROCEDURE — 29405 APPL SHORT LEG CAST: CPT | Mod: 58,RT,S$GLB, | Performed by: ORTHOPAEDIC SURGERY

## 2017-12-07 NOTE — PROGRESS NOTES
Subjective:      Patient ID: Kiel Ayala is a 42 y.o. male.    Chief Complaint: Post-op Evaluation of the Right Foot (DOS: 11-20-17/HWR; subtalar fusion, excision neuroma sural nerve )    Doing fairly well s/p hwr, sural nerve neurolysis and subtalar fusion. He rates his pain as 6/10 today. He says the nerve pain is doing better.   Social History     Occupational History    Not on file.     Social History Main Topics    Smoking status: Never Smoker    Smokeless tobacco: Never Used    Alcohol use Yes      Comment: rarely/ twice a year    Drug use: No    Sexual activity: Not on file            Objective:    Ortho Exam   RLE: Neurovascularly intact, numbness in sural nerve distribution as expected.  Incision healing well, moderate swelling, sutures are intact.  No signs of infection.    XRAYS: 2 views of right calcaneus obtained and reviewed today reveal good alignment, hardware is intact.       Assessment:         s/p hwr, sural nerve neurolysis and subtalar fusion.    Plan:       Short leg cast applied. Non-weightbearing. F/u 2 weeks with xray out of plaster right calcaneus.

## 2017-12-11 ENCOUNTER — TELEPHONE (OUTPATIENT)
Dept: ORTHOPEDICS | Facility: CLINIC | Age: 42
End: 2017-12-11

## 2017-12-11 RX ORDER — OXYCODONE HYDROCHLORIDE 15 MG/1
15 TABLET ORAL EVERY 4 HOURS PRN
Qty: 42 TABLET | Refills: 0 | Status: SHIPPED | OUTPATIENT
Start: 2017-12-11 | End: 2017-12-19 | Stop reason: ALTCHOICE

## 2017-12-11 NOTE — TELEPHONE ENCOUNTER
----- Message from Karis Shelton sent at 12/11/2017 11:27 AM CST -----  Patient is calling because his pharmacy will not have the oxyCODONE (ROXICODONE) 15 MG Tab until later this week. He wants to know if office can resend it to:      [facility] [address]  [address] [facility] [place] [address]  Phone: [phone] Fax: [phone]    Please call when completed at [phone].

## 2017-12-11 NOTE — TELEPHONE ENCOUNTER
Spoke to patient. Advised that Dr. Washburn does not return to clinic until tomorrow to be able to send the refill. Spoke to pharmacy who said the medication would be available tomorrow morning. Patient stated understanding.

## 2017-12-11 NOTE — TELEPHONE ENCOUNTER
----- Message from Lauren Tran sent at 12/11/2017 11:45 AM CST -----  Contact: Arlene/[facility]/[phone]  Arlene is requesting a call back re: Roxicodone being out of stock, they are requesting the Rx be e-scribed to another pharmacy. No other information was give.

## 2017-12-11 NOTE — TELEPHONE ENCOUNTER
----- Message from Elizabeth Duenas sent at 12/11/2017  9:44 AM CST -----  Contact: self  Needs refill on oxyCODONE (ROXICODONE) 15 MG Tab   Please call back at [phone] (home)     [id number] - [place], [place] - [address] [address]  Phone: [phone] Fax: [phone]

## 2017-12-11 NOTE — TELEPHONE ENCOUNTER
[facility], she stated the medication will be filled tomorrow morning. Informed her  is out of the office until tomorrow and can not send another refill until tomorrow anyway, so pt will have to wait until they get the medication in tomorrow and have the script filled.She stated understanding and will fill it tomorrow.

## 2017-12-19 RX ORDER — OXYCODONE AND ACETAMINOPHEN 10; 325 MG/1; MG/1
1 TABLET ORAL EVERY 4 HOURS PRN
Qty: 42 TABLET | Refills: 0 | Status: SHIPPED | OUTPATIENT
Start: 2017-12-19 | End: 2017-12-26 | Stop reason: SDUPTHER

## 2017-12-19 NOTE — TELEPHONE ENCOUNTER
----- Message from Ana Willis sent at 12/19/2017  9:50 AM CST -----  Contact: pt [phone]  Patient called for a refill on his Oxycodone 15 ml

## 2017-12-20 DIAGNOSIS — G57.81 SURAL NEUROPATHY, RIGHT: Primary | ICD-10-CM

## 2017-12-20 DIAGNOSIS — M19.071 OSTEOARTHRITIS OF RIGHT SUBTALAR JOINT: ICD-10-CM

## 2017-12-21 ENCOUNTER — OFFICE VISIT (OUTPATIENT)
Dept: ORTHOPEDICS | Facility: CLINIC | Age: 42
End: 2017-12-21
Payer: OTHER MISCELLANEOUS

## 2017-12-21 ENCOUNTER — HOSPITAL ENCOUNTER (OUTPATIENT)
Dept: RADIOLOGY | Facility: HOSPITAL | Age: 42
Discharge: HOME | End: 2017-12-21
Attending: ORTHOPAEDIC SURGERY
Payer: OTHER MISCELLANEOUS

## 2017-12-21 VITALS
DIASTOLIC BLOOD PRESSURE: 78 MMHG | WEIGHT: 160 LBS | HEIGHT: 65 IN | SYSTOLIC BLOOD PRESSURE: 121 MMHG | BODY MASS INDEX: 26.66 KG/M2 | HEART RATE: 95 BPM

## 2017-12-21 DIAGNOSIS — M19.071 OSTEOARTHRITIS OF RIGHT SUBTALAR JOINT: Primary | ICD-10-CM

## 2017-12-21 DIAGNOSIS — M19.071 OSTEOARTHRITIS OF RIGHT SUBTALAR JOINT: ICD-10-CM

## 2017-12-21 DIAGNOSIS — G57.81 SURAL NEUROPATHY, RIGHT: ICD-10-CM

## 2017-12-21 PROCEDURE — 73650 X-RAY EXAM OF HEEL: CPT | Mod: TC,PN,RT

## 2017-12-21 PROCEDURE — 99999 PR PBB SHADOW E&M-EST. PATIENT-LVL III: CPT | Mod: PBBFAC,,, | Performed by: ORTHOPAEDIC SURGERY

## 2017-12-21 PROCEDURE — 99024 POSTOP FOLLOW-UP VISIT: CPT | Mod: S$GLB,,, | Performed by: ORTHOPAEDIC SURGERY

## 2017-12-21 PROCEDURE — 73650 X-RAY EXAM OF HEEL: CPT | Mod: 26,RT,, | Performed by: RADIOLOGY

## 2017-12-21 RX ORDER — ONDANSETRON 4 MG/1
8 TABLET, ORALLY DISINTEGRATING ORAL EVERY 8 HOURS PRN
Qty: 20 TABLET | Refills: 0 | Status: SHIPPED | OUTPATIENT
Start: 2017-12-21 | End: 2018-02-15

## 2017-12-21 NOTE — PROGRESS NOTES
Subjective:      Patient ID: Kiel Ayala is a 42 y.o. male.    Chief Complaint: Post-op Evaluation of the Right Foot and Post-op Evaluation (s/p HWR; subtalar fusion; sural neurolysis 11/20/17)    Doing fairly well s/p hwr, sural nerve neurolysis and subtalar fusion. He rates his pain as 7/10 today. He has had multiple falls with using crutches as his worker's comp did not approve his knee . Most recently he fell so hard he split the bottom of his cast.     Social History     Occupational History    Not on file.     Social History Main Topics    Smoking status: Never Smoker    Smokeless tobacco: Never Used    Alcohol use Yes      Comment: rarely/ twice a year    Drug use: No    Sexual activity: Not on file            Objective:    Ortho Exam   RLE: Neurovascularly intact, numbness in sural nerve distribution as expected.  Incision healing well, Mild swelling, distal sutures are intact.  No signs of infection.  tenderness to palpation at the fusion site. He has some eschar at the corner of the incision.     XRAYS: 2 views of right calcaneus obtained and reviewed today reveal good alignment, hardware is intact. There is interval progression of healing.       Assessment:         s/p hwr, sural nerve neurolysis and subtalar fusion.    Plan:       Short leg cast applied. Non-weightbearing. F/u 2 weeks with xray out of plaster right calcaneus.     I re-ordered his knee  as she is strict Non-weightbearing. It is medically necessary especially as he has had several falls while using the crutches.

## 2017-12-26 RX ORDER — OXYCODONE AND ACETAMINOPHEN 10; 325 MG/1; MG/1
1 TABLET ORAL EVERY 4 HOURS PRN
Qty: 42 TABLET | Refills: 0 | Status: SHIPPED | OUTPATIENT
Start: 2017-12-26 | End: 2018-01-04 | Stop reason: SDUPTHER

## 2017-12-26 NOTE — TELEPHONE ENCOUNTER
----- Message from Mallory Jaeger sent at 12/26/2017  1:56 PM CST -----  oxyCODONE-acetaminophen (PERCOCET)  mg per tablet refill    [phone] ([place])     [id number] - [facility] [address] [address]  Phone: [phone] Fax: [phone]

## 2017-12-27 ENCOUNTER — TELEPHONE (OUTPATIENT)
Dept: ORTHOPEDICS | Facility: CLINIC | Age: 42
End: 2017-12-27

## 2017-12-27 NOTE — TELEPHONE ENCOUNTER
----- Message from Karis Shelton sent at 12/27/2017  8:02 AM CST -----  Patient needs a refill on his oxyCODONE-acetaminophen (PERCOCET)  mg per tablet remitted to:      [id number] - [place], [place] - [address] [address]  Phone: [phone] Fax: [phone]    Please call when completed at [phone].

## 2017-12-27 NOTE — TELEPHONE ENCOUNTER
Spoke with pt, informed him the percocet was sent to [facility] yesterday at 3:04pm.Pt stated he will call them back, as they stated they only had medication for nausea.

## 2017-12-27 NOTE — TELEPHONE ENCOUNTER
----- Message from Lowell Sen sent at 12/27/2017  1:54 PM CST -----  Contact: pt  Pt is calling to see if he can have his medication refills called into [facility] instead of the [facility]  Call Back#[phone]  Thanks      [facility] [id number] - [place] [facility] - [address] [facility] [address] [address]  Phone: [phone] Fax: [phone]

## 2017-12-27 NOTE — TELEPHONE ENCOUNTER
Spoke to patient. Instructed patient that the medication will be available on Friday at the [facility]. Patient stated understanding.

## 2018-01-03 DIAGNOSIS — M19.071 OSTEOARTHRITIS OF RIGHT SUBTALAR JOINT: Primary | ICD-10-CM

## 2018-01-04 ENCOUNTER — HOSPITAL ENCOUNTER (OUTPATIENT)
Dept: RADIOLOGY | Facility: HOSPITAL | Age: 43
Discharge: HOME | End: 2018-01-04
Attending: ORTHOPAEDIC SURGERY
Payer: OTHER MISCELLANEOUS

## 2018-01-04 ENCOUNTER — CLINICAL SUPPORT (OUTPATIENT)
Dept: ORTHOPEDICS | Facility: CLINIC | Age: 43
End: 2018-01-04
Payer: OTHER MISCELLANEOUS

## 2018-01-04 DIAGNOSIS — M19.071 OSTEOARTHRITIS OF RIGHT SUBTALAR JOINT: ICD-10-CM

## 2018-01-04 PROCEDURE — 73650 X-RAY EXAM OF HEEL: CPT | Mod: TC,PN,RT

## 2018-01-04 PROCEDURE — 73650 X-RAY EXAM OF HEEL: CPT | Mod: 26,RT,, | Performed by: RADIOLOGY

## 2018-01-04 PROCEDURE — 99999 PR PBB SHADOW E&M-EST. PATIENT-LVL I: CPT | Mod: PBBFAC,,,

## 2018-01-04 RX ORDER — OXYCODONE AND ACETAMINOPHEN 10; 325 MG/1; MG/1
1 TABLET ORAL EVERY 4 HOURS PRN
Qty: 42 TABLET | Refills: 0 | Status: SHIPPED | OUTPATIENT
Start: 2018-01-04 | End: 2018-01-11 | Stop reason: SDUPTHER

## 2018-01-04 NOTE — PROGRESS NOTES
Pt seen in clinic for nurse visit. Cast removed from right foot, xrays done. Surgical site WDL, cast reapplied to right ankle, pt to f/u in 2 weeks with  per . Pt verbalized understanding.

## 2018-01-11 RX ORDER — OXYCODONE AND ACETAMINOPHEN 10; 325 MG/1; MG/1
1 TABLET ORAL EVERY 6 HOURS PRN
Qty: 42 TABLET | Refills: 0 | Status: SHIPPED | OUTPATIENT
Start: 2018-01-11 | End: 2018-01-18 | Stop reason: SDUPTHER

## 2018-01-16 DIAGNOSIS — M19.071 OSTEOARTHRITIS OF RIGHT SUBTALAR JOINT: Primary | ICD-10-CM

## 2018-01-18 ENCOUNTER — HOSPITAL ENCOUNTER (OUTPATIENT)
Dept: RADIOLOGY | Facility: HOSPITAL | Age: 43
Discharge: HOME | End: 2018-01-18
Attending: ORTHOPAEDIC SURGERY
Payer: OTHER MISCELLANEOUS

## 2018-01-18 ENCOUNTER — OFFICE VISIT (OUTPATIENT)
Dept: ORTHOPEDICS | Facility: CLINIC | Age: 43
End: 2018-01-18
Payer: OTHER MISCELLANEOUS

## 2018-01-18 VITALS
DIASTOLIC BLOOD PRESSURE: 74 MMHG | HEIGHT: 65 IN | WEIGHT: 160 LBS | BODY MASS INDEX: 26.66 KG/M2 | HEART RATE: 90 BPM | SYSTOLIC BLOOD PRESSURE: 123 MMHG

## 2018-01-18 DIAGNOSIS — G57.81 SURAL NEUROPATHY, RIGHT: ICD-10-CM

## 2018-01-18 DIAGNOSIS — M19.071 OSTEOARTHRITIS OF RIGHT SUBTALAR JOINT: Primary | ICD-10-CM

## 2018-01-18 DIAGNOSIS — S92.001D CLOSED FRACTURE OF BOTH CALCANEI WITH ROUTINE HEALING, SUBSEQUENT ENCOUNTER: ICD-10-CM

## 2018-01-18 DIAGNOSIS — S92.002D CLOSED FRACTURE OF BOTH CALCANEI WITH ROUTINE HEALING, SUBSEQUENT ENCOUNTER: ICD-10-CM

## 2018-01-18 DIAGNOSIS — M19.071 OSTEOARTHRITIS OF RIGHT SUBTALAR JOINT: ICD-10-CM

## 2018-01-18 PROCEDURE — 73650 X-RAY EXAM OF HEEL: CPT | Mod: TC,PN,RT

## 2018-01-18 PROCEDURE — 99999 PR PBB SHADOW E&M-EST. PATIENT-LVL III: CPT | Mod: PBBFAC,,, | Performed by: ORTHOPAEDIC SURGERY

## 2018-01-18 PROCEDURE — 97760 ORTHOTIC MGMT&TRAING 1ST ENC: CPT | Mod: S$GLB,,, | Performed by: ORTHOPAEDIC SURGERY

## 2018-01-18 PROCEDURE — 99024 POSTOP FOLLOW-UP VISIT: CPT | Mod: S$GLB,,, | Performed by: ORTHOPAEDIC SURGERY

## 2018-01-18 PROCEDURE — 73650 X-RAY EXAM OF HEEL: CPT | Mod: 26,RT,, | Performed by: RADIOLOGY

## 2018-01-18 RX ORDER — OXYCODONE AND ACETAMINOPHEN 10; 325 MG/1; MG/1
1 TABLET ORAL EVERY 6 HOURS PRN
Qty: 42 TABLET | Refills: 0 | Status: SHIPPED | OUTPATIENT
Start: 2018-01-18 | End: 2018-01-26 | Stop reason: SDUPTHER

## 2018-01-18 NOTE — LETTER
January 19, 2018      Ankur Washburn MD  [address] [facility] [address]  [facility] [address] [place] [address]  Phone: [phone]          Patient: Kiel Ayala   MR Number: [id number]   YOB: 1975   Date of Visit: 1/18/2018       Dear Dr. Ankur Washburn:    Thank you for referring Kiel Ayala to me for evaluation. Attached you will find relevant portions of my assessment and plan of care.    If you have questions, please do not hesitate to call me. I look forward to following Kiel Ayala along with you.    Sincerely,    Omi Tabor MD    Enclosure  CC:  No Recipients    If you would like to receive this communication electronically, please contact [email or web address] or [phone] to request more information on [facility] access.    For providers and/or their staff who would like to refer a patient to [facility], please contact us through our one-stop-shop provider referral line, Marily Gibson, at [phone].    If you feel you have received this communication in error or would no longer like to receive these types of communications, please e-mail [email or web address]

## 2018-01-19 NOTE — PROGRESS NOTES
Subjective:      Patient ID: Kiel Ayala is a 42 y.o. male.    Chief Complaint: Foot Pain (R foot s/p HWR, subtalar fusion, sural neurolysis 11/20/17)    Doing fairly well s/p hwr, sural nerve neurolysis and subtalar fusion. He rates his pain as 6/10 today.  He is ready to start walking in the boot.  No new complications.  Doing well.    Social History     Occupational History    Not on file.     Social History Main Topics    Smoking status: Never Smoker    Smokeless tobacco: Never Used    Alcohol use Yes      Comment: rarely/ twice a year    Drug use: No    Sexual activity: Not on file            Objective:    Ortho Exam   RLE: Neurovascularly intact, numbness in sural nerve distribution as expected.  Incision healed well, Mild swelling.  No signs of infection.  tenderness to palpation at the fusion site.     XRAYS: 2 views of right calcaneus obtained and reviewed today reveal good alignment, hardware is intact. There is interval progression of healing.       Assessment:         s/p hwr, sural nerve neurolysis and subtalar fusion.    Plan:   We'll go ahead and get him into a boot.  I'll return him some compression stockings.  We also ordered him some physical therapy.  Follow-up in 4 weeks with repeat x-ray of the right calcaneus..We performed a custom orthotic/brace fitting, adjusting and training with the patient. The patient demonstrated understanding and proper care. This was performed for 15 minutes.

## 2018-01-25 ENCOUNTER — TELEPHONE (OUTPATIENT)
Dept: ORTHOPEDICS | Facility: CLINIC | Age: 43
End: 2018-01-25

## 2018-01-25 NOTE — TELEPHONE ENCOUNTER
----- Message from Lauren Tran sent at 1/25/2018  8:28 AM CST -----  Contact: Self/[phone]  Patient is requesting a refill of Percocet to be sent to the pharmacy on file.

## 2018-01-26 RX ORDER — OXYCODONE AND ACETAMINOPHEN 10; 325 MG/1; MG/1
1 TABLET ORAL EVERY 6 HOURS PRN
Qty: 42 TABLET | Refills: 0 | Status: SHIPPED | OUTPATIENT
Start: 2018-01-26 | End: 2018-02-02 | Stop reason: SDUPTHER

## 2018-02-02 RX ORDER — OXYCODONE AND ACETAMINOPHEN 10; 325 MG/1; MG/1
1 TABLET ORAL EVERY 6 HOURS PRN
Qty: 42 TABLET | Refills: 0 | Status: SHIPPED | OUTPATIENT
Start: 2018-02-02 | End: 2018-02-12 | Stop reason: SDUPTHER

## 2018-02-02 NOTE — TELEPHONE ENCOUNTER
----- Message from Lauren Tran sent at 2/2/2018  9:14 AM CST -----  Contact: Self  Pt is requesting a Rx refill of Percocet to be sent to the pharmacy on file.

## 2018-02-07 ENCOUNTER — TELEPHONE (OUTPATIENT)
Dept: REHABILITATION | Facility: HOSPITAL | Age: 43
End: 2018-02-07

## 2018-02-09 ENCOUNTER — TELEPHONE (OUTPATIENT)
Dept: ORTHOPEDICS | Facility: CLINIC | Age: 43
End: 2018-02-09

## 2018-02-09 DIAGNOSIS — M19.071 OSTEOARTHRITIS OF RIGHT SUBTALAR JOINT: Primary | ICD-10-CM

## 2018-02-09 NOTE — TELEPHONE ENCOUNTER
Spoke to patient. Patient will have enough pain medicine to last until Monday. Do you want to refill?

## 2018-02-09 NOTE — TELEPHONE ENCOUNTER
----- Message from Lauren Tran sent at 2/9/2018 11:09 AM CST -----  Contact: Self/[phone]  Pt is requesting a Rx refill of Percocet to be sent to the pharmacy on file.

## 2018-02-12 RX ORDER — OXYCODONE AND ACETAMINOPHEN 10; 325 MG/1; MG/1
1 TABLET ORAL EVERY 6 HOURS PRN
Qty: 42 TABLET | Refills: 0 | Status: SHIPPED | OUTPATIENT
Start: 2018-02-12 | End: 2018-02-21 | Stop reason: SDUPTHER

## 2018-02-14 ENCOUNTER — CLINICAL SUPPORT (OUTPATIENT)
Dept: REHABILITATION | Facility: HOSPITAL | Age: 43
End: 2018-02-14
Attending: ORTHOPAEDIC SURGERY
Payer: OTHER MISCELLANEOUS

## 2018-02-14 DIAGNOSIS — M19.071 ARTHRITIS OF RIGHT SUBTALAR JOINT: Primary | ICD-10-CM

## 2018-02-14 DIAGNOSIS — R26.9 GAIT ABNORMALITY: ICD-10-CM

## 2018-02-14 PROCEDURE — 97161 PT EVAL LOW COMPLEX 20 MIN: CPT | Mod: PN

## 2018-02-14 NOTE — PLAN OF CARE
TIME RECORD    Date: 02/14/2018    Start Time:  1410  Stop Time:  1455    PROCEDURES:    TIMED  Procedure Time Min.    Start:  Stop:     Start:  Stop:     Start:  Stop:     Start:  Stop:          UNTIMED  Procedure Time Min.   EVAL Start:  Stop:     Start:  Stop:      Total Timed Minutes:    Total Timed Units:    Total Untimed Units:  1  Charges Billed/# of units:  1    OUTPATIENT PHYSICAL THERAPY   PATIENT EVALUATION  Onset Date: 11/20/17 DOS, hardware removal with subtalar fusion.  Primary Diagnosis:   1. Arthritis of right subtalar joint     2. Gait abnormality       Treatment Diagnosis: Gait abnormality.  Past Medical History:   Diagnosis Date    Fall 12/09/2016    from roof with bilateral ankle fxs     Precautions: STD.  Prior Therapy: In 2017  Medications: Kiel Ayala has a current medication list which includes the following prescription(s): cyclobenzaprine, gabapentin, ondansetron, and oxycodone-acetaminophen.  Nutrition:  Normal  History of Present Illness: Pt fell from a roof 12/9/17 at work and suffered fx of calcaneus bilaterally.It was repaired with ORIF bilaterally on 1/4/17.  Prior Level of Function: Independent  Social History: Not working since injury.  Place of Residence (Steps/Adaptations): In house with wife, 2 steps to enter.  Functional Deficits Leading to Referral/Nature of Injury: Difficulties with ADLs,functional activities, homemaking, self care.  Patient Therapy Goals: Decrease pain,improve function.    Subjective     Kiel Ayala states .    Pain:  Location: Right foot.  Description: Aching, Burning, Throbbing, Sharp, Electric and Variable  Activities Which Increase Pain: Standing, Bending, Walking, Extension, Flexing, Lifting and Getting out of bed/chair  Activities Which Decrease Pain: relaxation, pain medication, lying down, rest and elevation  Pain Scale: 6/10 at best 7/10 now  7/10 at worst    Objective     Posture: ER RLE,  Palpation: Lateral and posterior aspect of rt  ankle tender.  Sensation: Intact.  DTRs:  Range of Motion/Strength:   Ankle  Right   Left  Pain/Dysfunction with Movement    AROM PROM MMT AROM PROM MMT    Plantarflexion 15 20 2+       Dorsiflexion 12 15 2+       Inversion 0  1       Eversion 0  1            Flexibility: Decreased in RLE.  Gait: Without AD  Analysis: SBA - antalgic,guarded, ER RLE, wearing a boot.  Bed Mobility:Independent  Transfers: Independent  Special Tests: NA  Other: FIG 8 LT;50.0, RT;50.4 cm.  Treatment: EVAL.    Assessment       Initial Assessment (Pertinent finding, problem list and factors affecting outcome): Pt presents ROM and strength deficits, gait abnormality, decreased function due to pain and above listed deficits.  Rehab Potiential: good    Short Term Goals (3 Weeks): 1.Decrease pain x 1 grade. 2.Start HEP.  Long Term Goals (6 Weeks): 1.Pain 0-4/10.2. Independent HEP. 3.ROM WFL. 4.Strength >4/5.5.Gait WNL/WFL. 6.LEFS 65/80. 7.Restore previous LI.    Plan     Certification Period: 2/14/18 to 3/28/18  Recommended Treatment Plan: 2 times per week for 6 weeks: Electrical Stimulation PRN., Gait Training, Group Therapy, Manual Therapy, Moist Heat/ Ice, Patient Education, Therapeutic Activites, Therapeutic Exercise and Whirlpool/Fluidotherapy  Other Recommendations: HEP.      Therapist: Bal Holland, PT    I CERTIFY THE NEED FOR THESE SERVICES FURNISHED UNDER THIS PLAN OF TREATMENT AND WHILE UNDER MY CARE    Physician's comments: ________________________________________________________________________________________________________________________________________________      Physician's Name: ___________________________________

## 2018-02-15 ENCOUNTER — HOSPITAL ENCOUNTER (OUTPATIENT)
Dept: RADIOLOGY | Facility: HOSPITAL | Age: 43
Discharge: HOME | End: 2018-02-15
Attending: ORTHOPAEDIC SURGERY
Payer: OTHER MISCELLANEOUS

## 2018-02-15 ENCOUNTER — OFFICE VISIT (OUTPATIENT)
Dept: ORTHOPEDICS | Facility: CLINIC | Age: 43
End: 2018-02-15
Payer: OTHER MISCELLANEOUS

## 2018-02-15 VITALS
HEIGHT: 65 IN | DIASTOLIC BLOOD PRESSURE: 75 MMHG | HEART RATE: 107 BPM | WEIGHT: 160 LBS | BODY MASS INDEX: 26.66 KG/M2 | SYSTOLIC BLOOD PRESSURE: 123 MMHG

## 2018-02-15 DIAGNOSIS — M19.071 OSTEOARTHRITIS OF RIGHT SUBTALAR JOINT: ICD-10-CM

## 2018-02-15 DIAGNOSIS — M19.071 OSTEOARTHRITIS OF RIGHT SUBTALAR JOINT: Primary | ICD-10-CM

## 2018-02-15 PROCEDURE — 73650 X-RAY EXAM OF HEEL: CPT | Mod: 26,LT,, | Performed by: RADIOLOGY

## 2018-02-15 PROCEDURE — 99999 PR PBB SHADOW E&M-EST. PATIENT-LVL III: CPT | Mod: PBBFAC,,, | Performed by: ORTHOPAEDIC SURGERY

## 2018-02-15 PROCEDURE — 73650 X-RAY EXAM OF HEEL: CPT | Mod: TC,PN,LT

## 2018-02-15 PROCEDURE — 99024 POSTOP FOLLOW-UP VISIT: CPT | Mod: S$GLB,,, | Performed by: ORTHOPAEDIC SURGERY

## 2018-02-15 NOTE — LETTER
February 15, 2018      Ankur Washburn MD  [address] [facility] [address]  [facility] [id number] [place] [address]  Phone: [phone]          Patient: Kiel Ayala   MR Number: [id number]   YOB: 1975   Date of Visit: 2/15/2018       Dear Dr. Ankur Washburn:    Thank you for referring Kiel Ayala to me for evaluation. Attached you will find relevant portions of my assessment and plan of care.    If you have questions, please do not hesitate to call me. I look forward to following Kiel Ayala along with you.    Sincerely,    Omi Tabor MD    Enclosure  CC:  No Recipients    If you would like to receive this communication electronically, please contact [email or web address] or [phone] to request more information on [employer] Link access.    For providers and/or their staff who would like to refer a patient to [facility], please contact us through our one-stop-shop provider referral line, Marily Gibson, at [phone].    If you feel you have received this communication in error or would no longer like to receive these types of communications, please e-mail [email or web address]

## 2018-02-15 NOTE — PROGRESS NOTES
Subjective:      Patient ID: Kiel Ayala is a 42 y.o. male.    Chief Complaint: Foot Pain (R foot 4wk f/u)    Doing fairly well s/p hwr, sural nerve neurolysis and subtalar fusion. He rates his pain as 6/10 today.  He is walking in the boot.  No new complications.  He starts physical therapy next week.    Social History     Occupational History    Not on file.     Social History Main Topics    Smoking status: Never Smoker    Smokeless tobacco: Never Used    Alcohol use Yes      Comment: rarely/ twice a year    Drug use: No    Sexual activity: Not on file            Objective:    Ortho Exam   RLE: Neurovascularly intact, numbness in sural nerve distribution as expected.  Incision healed well, Mild swelling.  No signs of infection.  tenderness to palpation at the fusion site.     XRAYS: 2 views of right calcaneus obtained and reviewed today reveal good alignment, hardware is intact. There is interval progression of healing.       Assessment:         s/p hwr, sural nerve neurolysis and subtalar fusion.    Plan:   Follow-up with Dr. Washburn in 4 weeks.  Encouraged him to start physical therapy and a start to wean out of the boot as tolerated.

## 2018-02-21 ENCOUNTER — CLINICAL SUPPORT (OUTPATIENT)
Dept: REHABILITATION | Facility: HOSPITAL | Age: 43
End: 2018-02-21
Attending: ORTHOPAEDIC SURGERY
Payer: OTHER MISCELLANEOUS

## 2018-02-21 DIAGNOSIS — S92.002D CLOSED FRACTURE OF BOTH CALCANEI WITH ROUTINE HEALING, SUBSEQUENT ENCOUNTER: ICD-10-CM

## 2018-02-21 DIAGNOSIS — S92.001D CLOSED FRACTURE OF BOTH CALCANEI WITH ROUTINE HEALING, SUBSEQUENT ENCOUNTER: ICD-10-CM

## 2018-02-21 PROCEDURE — 97140 MANUAL THERAPY 1/> REGIONS: CPT | Mod: PN

## 2018-02-21 PROCEDURE — 97010 HOT OR COLD PACKS THERAPY: CPT | Mod: PN

## 2018-02-21 PROCEDURE — 97110 THERAPEUTIC EXERCISES: CPT | Mod: PN

## 2018-02-21 RX ORDER — OXYCODONE AND ACETAMINOPHEN 10; 325 MG/1; MG/1
1 TABLET ORAL EVERY 6 HOURS PRN
Qty: 42 TABLET | Refills: 0 | Status: SHIPPED | OUTPATIENT
Start: 2018-02-21 | End: 2018-03-15

## 2018-02-21 NOTE — TELEPHONE ENCOUNTER
----- Message from Lauren Tran sent at 2/21/2018  9:27 AM CST -----  Contact: Self  Pt is requesting Rx refill of Percocet to be sent to the pharmacy on file.

## 2018-02-21 NOTE — TELEPHONE ENCOUNTER
Patient is requesting a refill on pain meds. Last refill was Percocet 10/325mg tabs # 42 on 2/12/18

## 2018-02-23 ENCOUNTER — CLINICAL SUPPORT (OUTPATIENT)
Dept: REHABILITATION | Facility: HOSPITAL | Age: 43
End: 2018-02-23
Attending: ORTHOPAEDIC SURGERY
Payer: OTHER MISCELLANEOUS

## 2018-02-23 DIAGNOSIS — S92.001D CLOSED FRACTURE OF BOTH CALCANEI WITH ROUTINE HEALING, SUBSEQUENT ENCOUNTER: ICD-10-CM

## 2018-02-23 DIAGNOSIS — S92.002D CLOSED FRACTURE OF BOTH CALCANEI WITH ROUTINE HEALING, SUBSEQUENT ENCOUNTER: ICD-10-CM

## 2018-02-23 PROCEDURE — 97010 HOT OR COLD PACKS THERAPY: CPT | Mod: PN

## 2018-02-23 PROCEDURE — 97110 THERAPEUTIC EXERCISES: CPT | Mod: PN

## 2018-02-23 PROCEDURE — 97140 MANUAL THERAPY 1/> REGIONS: CPT | Mod: PN

## 2018-02-28 ENCOUNTER — CLINICAL SUPPORT (OUTPATIENT)
Dept: REHABILITATION | Facility: HOSPITAL | Age: 43
End: 2018-02-28
Attending: ORTHOPAEDIC SURGERY
Payer: OTHER MISCELLANEOUS

## 2018-02-28 DIAGNOSIS — R26.9 GAIT ABNORMALITY: Primary | ICD-10-CM

## 2018-02-28 PROCEDURE — 97010 HOT OR COLD PACKS THERAPY: CPT | Mod: PN

## 2018-02-28 PROCEDURE — 97110 THERAPEUTIC EXERCISES: CPT | Mod: PN

## 2018-02-28 NOTE — PROGRESS NOTES
"Name: Kiel GARCIA [facility] Number: [id number]  Date of Treatment: 02/23/2018  Diagnosis:   Encounter Diagnosis   Name Primary?    Closed fracture of both calcanei with routine healing, subsequent encounter        Time in: 1100  Time Out: 1200  Total Treatment Time: 58  Group Time: 0      Subjective:    Kiel reports "about the same".  Patient reports their pain to be 6/10 on a 0-10 scale with 0 being no pain and 10 being the worst pain imaginable.    Objective    Patient received individual therapy to increase strength, ROM and flexibility with 0 patients with activities as follows:     Kiel received therapeutic exercises to develop strength, ROM and flexibility for 35 minutes including:    Nu-step L3 x 10'              Standing gastroc stretch 3x30s B              Standing soleus stretch 3x30s B              Standing heel/toe raise x 30              Seated hamstring stretch 3x30s ea              Ankle 4-way w/red tband R only x 30 ea.     MFT board DF/PF x 30   MFT board inv/ever x30     Kiel received the following manual therapy techniques: Soft tissue Mobilization and Friction Massage were applied to the: R lateral foot/ankle for 10 minutes. Initiated KT tape using I strip anchored @ lateral heel with no tension extending proximally @ 25% tension using side to side pattern, terminating just proximal to incision with no tension.  2nd strip: I strip anchored with no tension @ posterolateral heel extending along incision distally with side <> side pattern terminating just distal to incision with no tension.  Instructed pt in care and removal of KT tape.      The patient received the following supervised modalities after being cleared for contradictions: CP to R ankle x 10'    Written Home Exercises Provided: Reviewed.    Pt demo good understanding of the education provided. Kiel demonstrated good return demonstration of activities.     Assessment:   Minimal inversion/eversion available.  Significant soft " tissue restriction along incision particularly over posterolateral heel.      Pt will continue to benefit from skilled PT intervention. Medical Necessity is demonstrated by:  Continued inability to participate in vocational pursuits, Pain limits function of effected part for some activities and Requires skilled supervision to complete and progress HEP.    Patient is making fair progress towards established goals.    New/Revised goals: N/A      Plan:  Continue with established Plan of Care towards PT goals.

## 2018-02-28 NOTE — PROGRESS NOTES
"Name: Kiel GARCIA [facility] Number: [id number]  Date of Treatment:02/21/2018  Diagnosis:   Encounter Diagnosis   Name Primary?    Closed fracture of both calcanei with routine healing, subsequent encounter        Time in: 1105  Time Out: 1200  Total Treatment Time: 53  Group Time: 0      Subjective:    Kiel reports "It's about the same."  Patient reports their pain to be 6/10 on a 0-10 scale with 0 being no pain and 10 being the worst pain imaginable.    Objective    Patient received individual therapy to increase strength, ROM and flexibility with 0 patients with activities as follows:     Kiel received therapeutic exercises to develop strength, ROM and flexibility for 35 minutes including:    Nu-step L1 x 12'   Standing gastroc stretch 3x30s B   Standing soleus stretch 3x30s B   Standing heel raise x 30   Seated hamstring stretch 3x30s ea   Ankle 4-way w/red tband R only x 30 ea.      Kiel received the following manual therapy techniques: Soft tissue Mobilization and Friction Massage were applied to the: R lateral foot incision for 8 minutes.     The patient received the following supervised modalities after being cleared for contradictions: Cold pack to R foot/ankle x 10'    Written Home Exercises Provided: Initiated exercise instruction.    Pt demo good understanding of the education provided. Kiel demonstrated good return demonstration of activities.     Assessment:   Significant soft tissue restriction along incision.      Pt will continue to benefit from skilled PT intervention. Medical Necessity is demonstrated by:  Pain limits function of effected part for some activities, Unable to participate fully in daily activities, Requires skilled supervision to complete and progress HEP and Weakness.    Patient is making fair progress towards established goals.    New/Revised goals: N/A      Plan:  Continue with established Plan of Care towards PT goals.   "

## 2018-02-28 NOTE — PROGRESS NOTES
"Name: Kiel GARCIA [facility] Number: [id number]  Date of Treatment: 02/28/2018   Diagnosis:   Encounter Diagnosis   Name Primary?    Gait abnormality Yes       Time in: 1200  Time Out: 1255  Total Treatment Time: 55  Group Time: 0      Subjective:    Kiel reports continued pain.  Patient reports their pain to be 6/10 on a 0-10 scale with 0 being no pain and 10 being the worst pain imaginable.    Objective    Kiel received therapeutic exercises to develop strength, endurance and flexibility for 45 minutes including:     NuStep Lv3 10'  Gastroc stretch 3x30" 1/2 roll R  Soleus stretch 3x30" 1/2 roll R  HR/TR x30  MFT x30: DF/PF and lateral R  Hamstring stretch long sit on stool 3x30" R/L  PF stretch with towel 3x30" R  Ankle 4-way x30 Red Tband R    Cold pack 10' to R ankle to decrease soreness    Pt demo good understanding of the education provided. Kiel demonstrated good return demonstration of activities.     Assessment:     Pt will continue to benefit from skilled PT intervention. Medical Necessity is demonstrated by:  Continued inability to participate in vocational pursuits, Pain limits function of effected part for some activities, Unable to participate fully in daily activities, Requires skilled supervision to complete and progress HEP and Weakness.    Plan:    Continue with established Plan of Care towards PT goals.   "

## 2018-03-05 ENCOUNTER — TELEPHONE (OUTPATIENT)
Dept: ORTHOPEDICS | Facility: CLINIC | Age: 43
End: 2018-03-05

## 2018-03-05 NOTE — TELEPHONE ENCOUNTER
Attempted to call patient. No vm setup. No answer. Dr. Washburn will not refill pain medication. Will put in referral for pain management.

## 2018-03-05 NOTE — TELEPHONE ENCOUNTER
----- Message from Lauren Tran sent at 3/5/2018  8:44 AM CST -----  Contact: Self  Pt is requesting a Rx refill of Percocet to be sent to the pharmacy on file.

## 2018-03-07 ENCOUNTER — CLINICAL SUPPORT (OUTPATIENT)
Dept: REHABILITATION | Facility: HOSPITAL | Age: 43
End: 2018-03-07
Payer: OTHER MISCELLANEOUS

## 2018-03-07 ENCOUNTER — TELEPHONE (OUTPATIENT)
Dept: ORTHOPEDICS | Facility: CLINIC | Age: 43
End: 2018-03-07

## 2018-03-07 DIAGNOSIS — R26.9 GAIT ABNORMALITY: ICD-10-CM

## 2018-03-07 PROCEDURE — 97010 HOT OR COLD PACKS THERAPY: CPT | Mod: PN

## 2018-03-07 PROCEDURE — 97110 THERAPEUTIC EXERCISES: CPT | Mod: PN

## 2018-03-07 PROCEDURE — 97140 MANUAL THERAPY 1/> REGIONS: CPT | Mod: PN

## 2018-03-07 NOTE — TELEPHONE ENCOUNTER
----- Message from Lauren Tran sent at 3/7/2018  9:33 AM CST -----  Contact: Anu/Girish/[phone]  Anu is requesting a call back re: scheduling a PT conference.

## 2018-03-07 NOTE — TELEPHONE ENCOUNTER
Spoke to Anu with [facility]. Advised that she would need to go through [employer] to setup the workers comp conference. Anu stated understanding.

## 2018-03-07 NOTE — PROGRESS NOTES
Name: Kiel GARCIA [facility] Number: [id number]  Date of Treatment: 03/07/2018   Diagnosis:   Encounter Diagnosis   Name Primary?    Gait abnormality        Time in: 1102  Time Out: 1200  Total Treatment Time: 58  Group Time: 0      Subjective:    Kiel reports increased pain.  Patient reports their pain to be 8/10 on a 0-10 scale with 0 being no pain and 10 being the worst pain imaginable.    Objective    Patient received individual therapy to increase ROM, flexibility with 0 patients with activities as follows:     Kiel received therapeutic exercises to develop ROM, flexibility for 25 minutes including:     Bike 10'  Pt in long sitting on elevated mat  Ankle 2-way RTB DF/inv R only x 30 w knee extended  Roll under ankle w CW/CCW AROM 2' each  30 reps each w knee flexed:  Active PF, DF, inversion       Kiel received the following manual therapy techniques: Scar massage followed by soft tissue Mobilization were applied to the: R foot, ankle, leg for 20 minutes.     10' CP application following treatment do decrease pain/inflamation     Provided RTB for home use w ankle ex. Pt previously instructed in ankle 4 way ex w resistance band  Pt demo good understanding of the education provided. Kiel demonstrated good return demonstration of activities.     Assessment:     Encouraged use of cane to unload R heel when pain limits mobility.  Pt will continue to benefit from skilled PT intervention. Medical Necessity is demonstrated by:  Continued inability to participate in vocational pursuits, Pain limits function of effected part for some activities, Requires skilled supervision to complete and progress HEP and Weakness.    Patient is making fair progress towards established goals.    New/Revised goals: n/a      Plan:  Continue with established Plan of Care towards PT goals.

## 2018-03-09 ENCOUNTER — CLINICAL SUPPORT (OUTPATIENT)
Dept: REHABILITATION | Facility: HOSPITAL | Age: 43
End: 2018-03-09
Payer: OTHER MISCELLANEOUS

## 2018-03-09 DIAGNOSIS — R26.9 GAIT ABNORMALITY: ICD-10-CM

## 2018-03-09 PROCEDURE — 97110 THERAPEUTIC EXERCISES: CPT | Mod: PN

## 2018-03-09 PROCEDURE — 97140 MANUAL THERAPY 1/> REGIONS: CPT | Mod: PN

## 2018-03-09 NOTE — PROGRESS NOTES
Name: Kiel GARCIA [facility] Number: [id number]  Date of Treatment: 03/09/2018   Diagnosis:   Encounter Diagnosis   Name Primary?    Gait abnormality        Time in: 1058  Time Out: 1154  Total Treatment Time: 54      Subjective:    Kiel reports pain remains R foot region.  Patient reports their pain to be 7/10 on a 0-10 scale with 0 being no pain and 10 being the worst pain imaginable.    Objective    Patient received individual therapy to increase strength, ROM and flexibility with activities as follows:     Kiel received therapeutic exercises to develop strength, ROM and flexibility for 40 minutes including:     Nustep level 3 x 10 minutes  Standing gastroc stretch 3 x 30 sec B  Soleus stretch 3 x 30 sec B  SLS 3 x 30 sec R  HR/TR x 30   DF stretch x 10, 10 sec hold, seated  Hamstring stretch 3 x 30 sec R  MFT board: df/pf, sup/pro x 30 each  Ankle 4 way RTB x 30  PF stretch with towel 3 x 30 sec    Kiel received the following manual therapy techniques: Myofacial release with therapy bar were applied to the: R ankle region for 14 minutes.     Written Home Exercises Provided: cont HEP  Pt demo good understanding of the education provided. Kiel demonstrated good return demonstration of activities.     Assessment:     Sensitivity decreased after MT was completed.  Instructed pt to use towel tp desensitize ankle region 2-3 minutes, 4-5 times daily.  Pt verbalized understanding.  Pt will continue to benefit from skilled PT intervention. Medical Necessity is demonstrated by:  Fall Risk, Pain limits function of effected part for some activities, Unable to participate fully in daily activities, Requires skilled supervision to complete and progress HEP, Weakness and Edema.    Patient is making fair progress towards established goals.      Plan:  Continue with established Plan of Care towards PT goals.

## 2018-03-12 ENCOUNTER — TELEPHONE (OUTPATIENT)
Dept: ORTHOPEDICS | Facility: CLINIC | Age: 43
End: 2018-03-12

## 2018-03-12 NOTE — TELEPHONE ENCOUNTER
is requesting a rehab conference. Patient is being seen on Thursday at 11am. Do you want to do the conference at the same time?

## 2018-03-14 ENCOUNTER — CLINICAL SUPPORT (OUTPATIENT)
Dept: REHABILITATION | Facility: HOSPITAL | Age: 43
End: 2018-03-14
Attending: ORTHOPAEDIC SURGERY
Payer: OTHER MISCELLANEOUS

## 2018-03-14 DIAGNOSIS — M19.071 OSTEOARTHRITIS OF RIGHT SUBTALAR JOINT: Primary | ICD-10-CM

## 2018-03-14 DIAGNOSIS — R26.9 GAIT ABNORMALITY: Primary | ICD-10-CM

## 2018-03-14 PROCEDURE — 97022 WHIRLPOOL THERAPY: CPT | Mod: PN

## 2018-03-14 PROCEDURE — 97110 THERAPEUTIC EXERCISES: CPT | Mod: PN

## 2018-03-14 NOTE — PROGRESS NOTES
03/14/18 1000   Ankle Exercises   Band Plantarflexion Reps/Sets/Resistance RTB 30   Band Dorsiflexion Reps/Sets/Resistance RTB 30   Double Leg Calf Raises Reps/Sets/Weight 30   Standing Dorsiflexion Stretch(Gastroc) Reps/Sets/Hold Time 3X30   Bosu Step Up Reps/Sets/Hold Time 4'   Lumbar Exercises   Hamsting Stretch Reps/Sets/Hold Time 3X30   Additional Exercises   Additional Exercise NUSTEP 10 L4   Additional Exercise MTT 3'

## 2018-03-14 NOTE — PROGRESS NOTES
TIME RECORD    Date:  03/14/2018    Start Time:  1002  Stop Time:  1055    PROCEDURES:    TIMED  Procedure Time Min.   TE Start:1002  Stop:1042 40    Start:  Stop:     Start:  Stop:     Start:  Stop:          UNTIMED  Procedure Time Min.   FLUIDO Start:1043  Stop:1055 12    Start:  Stop:      Total Timed Minutes:  40  Total Timed Units:  3  Total Untimed Units:  1  Charges Billed/# of units:  4      Progress/Current Status    Subjective:     Patient ID: Kiel Ayala is a 42 y.o. male.  Diagnosis:   1. Gait abnormality       Pain: 5 /10      Objective:     TE for ROM,strength,gait f/b fluido x 12'.    Assessment:     Antalgic gait with ER.    Patient Education/Response:     Gait pattern ed.    Plans and Goals:     Cont per POC.

## 2018-03-15 ENCOUNTER — OFFICE VISIT (OUTPATIENT)
Dept: ORTHOPEDICS | Facility: CLINIC | Age: 43
End: 2018-03-15
Payer: OTHER MISCELLANEOUS

## 2018-03-15 ENCOUNTER — HOSPITAL ENCOUNTER (OUTPATIENT)
Dept: RADIOLOGY | Facility: HOSPITAL | Age: 43
Discharge: HOME | End: 2018-03-15
Attending: ORTHOPAEDIC SURGERY
Payer: OTHER MISCELLANEOUS

## 2018-03-15 VITALS
WEIGHT: 160 LBS | HEART RATE: 86 BPM | BODY MASS INDEX: 26.66 KG/M2 | HEIGHT: 65 IN | DIASTOLIC BLOOD PRESSURE: 75 MMHG | SYSTOLIC BLOOD PRESSURE: 112 MMHG

## 2018-03-15 DIAGNOSIS — M19.071 ARTHRITIS OF RIGHT SUBTALAR JOINT: Primary | ICD-10-CM

## 2018-03-15 DIAGNOSIS — M19.071 OSTEOARTHRITIS OF RIGHT SUBTALAR JOINT: ICD-10-CM

## 2018-03-15 PROCEDURE — 99999 PR PBB SHADOW E&M-EST. PATIENT-LVL III: CPT | Mod: PBBFAC,,, | Performed by: ORTHOPAEDIC SURGERY

## 2018-03-15 PROCEDURE — 73650 X-RAY EXAM OF HEEL: CPT | Mod: TC,PN,RT

## 2018-03-15 PROCEDURE — 73650 X-RAY EXAM OF HEEL: CPT | Mod: 26,RT,, | Performed by: RADIOLOGY

## 2018-03-15 PROCEDURE — 99214 OFFICE O/P EST MOD 30 MIN: CPT | Mod: S$GLB,,, | Performed by: ORTHOPAEDIC SURGERY

## 2018-03-15 RX ORDER — OXYCODONE AND ACETAMINOPHEN 5; 325 MG/1; MG/1
TABLET ORAL
Refills: 0 | COMMUNITY
Start: 2018-02-21 | End: 2018-03-15

## 2018-03-15 RX ORDER — GABAPENTIN 400 MG/1
CAPSULE ORAL
Refills: 3 | COMMUNITY
Start: 2018-02-15 | End: 2018-04-26

## 2018-03-15 NOTE — PROGRESS NOTES
Subjective:      Patient ID: Kiel Ayala is a 42 y.o. male.    Chief Complaint: Post-op Evaluation and Pain of the Left Foot    Doing fairly well s/p hwr, sural nerve neurolysis and subtalar fusion.  He rates his pain as 6/10 today.  He has just started PT about 3 weeks ago.    Social History     Occupational History    Not on file.     Social History Main Topics    Smoking status: Never Smoker    Smokeless tobacco: Never Used    Alcohol use Yes      Comment: rarely/ twice a year    Drug use: No    Sexual activity: Not on file          Objective:    Ortho Exam   RLE: Neurovascularly intact, numbness in sural nerve distribution as expected.  Incision healing well, Mild swelling.  Tenderness to palpation at the fusion site.   Decreased range of motion of the ankle -15 degrees of dorsiflexion and -15 degrees of  plantar flexion.    XRAYS: 2 views of right calcaneus obtained and reviewed today reveal good alignment, hardware is intact.  There is interval progression of healing.       Assessment:         s/p hwr, sural nerve neurolysis and subtalar fusion    Plan:       Continue PT. F/u 6 weeks with xray calcaneus.

## 2018-03-16 ENCOUNTER — CLINICAL SUPPORT (OUTPATIENT)
Dept: REHABILITATION | Facility: HOSPITAL | Age: 43
End: 2018-03-16
Payer: OTHER MISCELLANEOUS

## 2018-03-16 DIAGNOSIS — M19.071 ARTHRITIS OF RIGHT SUBTALAR JOINT: ICD-10-CM

## 2018-03-16 DIAGNOSIS — R26.9 GAIT ABNORMALITY: ICD-10-CM

## 2018-03-16 PROCEDURE — 97110 THERAPEUTIC EXERCISES: CPT | Mod: PN

## 2018-03-16 PROCEDURE — 97140 MANUAL THERAPY 1/> REGIONS: CPT | Mod: PN

## 2018-03-16 NOTE — PROGRESS NOTES
Name: Kiel GARCIA [facility] Number: [id number]  Date of Treatment: 03/16/2018   Diagnosis:   Encounter Diagnoses   Name Primary?    Gait abnormality     Arthritis of right subtalar joint        Time in: 1100  Time Out: 1200  Total Treatment Time: 60    Subjective:    Kiel reports no pain at present. Has not been performing HEP.      Objective:    Patient received individual therapy to increase strength, endurance, ROM, flexibility, posture and core stabilization with activities as follows:     Kiel received therapeutic exercises to develop strength, endurance, ROM, flexibility, posture and core stabilization for 50 minutes including:     Bike x 10'  Standing gastroc stretches 3/30s B over 1/2 foam roll in // bars  Standing soleus stretches 3/30s B over 1/2 foam roll in // bars  SLS R 3/30s   Seated hamstring stretches 3/30s L/R  Seated GTB ankle 4 way 10/3 R  Minisquats 10/3 B in // bars  Seated ankle 4 way AROM 10/3 R  Manual scar tissue mobilization and soft tissue mobilization, MFR with roller bar R ankle and foot, calf x 10'    Continue HEP daily.    Pt demo good understanding of the education provided. Patient demonstrated good return demonstration of activities.     Assessment:     Good tolerance for progression with ex's without increased discomfort reported. Decreased tenderness reported per pt today with manual.     Pt will continue to benefit from skilled PT intervention. Medical Necessity is demonstrated by:  Requires skilled supervision to complete and progress HEP and Weakness.    Patient is making good progress towards established goals.    Plan:    Continue with established Plan of Care towards PT goals.

## 2018-03-21 ENCOUNTER — CLINICAL SUPPORT (OUTPATIENT)
Dept: REHABILITATION | Facility: HOSPITAL | Age: 43
End: 2018-03-21
Attending: ORTHOPAEDIC SURGERY
Payer: OTHER MISCELLANEOUS

## 2018-03-21 DIAGNOSIS — M19.071 ARTHRITIS OF RIGHT SUBTALAR JOINT: ICD-10-CM

## 2018-03-21 DIAGNOSIS — R26.9 GAIT ABNORMALITY: ICD-10-CM

## 2018-03-21 PROCEDURE — 97010 HOT OR COLD PACKS THERAPY: CPT | Mod: PN

## 2018-03-21 PROCEDURE — 97110 THERAPEUTIC EXERCISES: CPT | Mod: PN

## 2018-03-21 NOTE — PROGRESS NOTES
"[id number]  Date of Treatment: 03/21/2018   Diagnosis:   Encounter Diagnoses   Name Primary?    Gait abnormality     Arthritis of right subtalar joint        Time in: 1108  Time Out: 1200  Total Treatment Time: 62    Subjective:    Kiel reports "same".  Patient reports their L ankle pain to be 6/10 on a 0-10 scale with 0 being no pain and 10 being the worst pain imaginable. Admits he has not yet begun HEP.     Objective:    Patient received individual therapy to increase strength, endurance, ROM, flexibility, posture and core stabilization with activities as follows:     Kiel received therapeutic exercises to develop strength, endurance, ROM, flexibility, posture and core stabilization for 52 minutes including:     Bike x 10'  Standing gastroc stretches 3/30s B over 1/2 foam roll in // bars  Standing soleus stretches 3/30s B over 1/2 foam roll in // bars  SLS L/R 3/30s   Seated hamstring stretches 3/30s L/R  Seated GTB ankle 4 way 10/3 R  Seated L ankle 4 way AROM  Minisquats 10/3 B in // bars  Seated ankle 4 way AROM 10/3 R  HR/TR B 10/3 in // bars  Lat step ups 2" 10/3 L  PROM R ankle     CP applied to the R ankle x 10 minutes in seated position after being cleared for contraindications.    Continue HEP daily.    Pt demo good understanding of the education provided. Patient demonstrated good return demonstration of activities.     Assessment:     Good progression of ex's without increase in discomfort.     Pt will continue to benefit from skilled PT intervention. Medical Necessity is demonstrated by:  Requires skilled supervision to complete and progress HEP and Weakness.    Patient is making good progress towards established goals.    Plan:    Continue with established Plan of Care towards PT goals.     "

## 2018-03-23 ENCOUNTER — CLINICAL SUPPORT (OUTPATIENT)
Dept: REHABILITATION | Facility: HOSPITAL | Age: 43
End: 2018-03-23
Attending: ORTHOPAEDIC SURGERY
Payer: OTHER MISCELLANEOUS

## 2018-03-23 DIAGNOSIS — R26.9 GAIT ABNORMALITY: ICD-10-CM

## 2018-03-23 DIAGNOSIS — M19.071 ARTHRITIS OF RIGHT SUBTALAR JOINT: ICD-10-CM

## 2018-03-23 PROCEDURE — 97110 THERAPEUTIC EXERCISES: CPT | Mod: PN

## 2018-03-23 NOTE — PROGRESS NOTES
"[id number]  Date of Treatment: 03/23/2018   Diagnosis:   Encounter Diagnoses   Name Primary?    Gait abnormality     Arthritis of right subtalar joint        Time in: 1105  Time Out: 1200  Total Treatment Time: 55    Subjective:    Kiel reports "same" R ankle discomfort.  Patient reports their pain to be 6/10 on a 0-10 scale with 0 being no pain and 10 being the worst pain imaginable.    Objective:    Patient received individual therapy to increase strength, endurance, ROM, flexibility, posture and core stabilization with activities as follows:     Kiel received therapeutic exercises to develop strength, endurance, ROM, flexibility, posture and core stabilization for 55 minutes including:     Bike x 10'  Standing gastroc stretches 3/30s B over 1/2 foam roll in // bars  Standing soleus stretches 3/30s B over 1/2 foam roll in // bars  SLS L/R 3/30s   Seated hamstring stretches 3/30s L/R  Seated RTB ankle 4 way 10/3 R  Seated L ankle 4 way AROM  Seated MFT A-P, L-R 10/3 R  Minisquats 10/3 B in // bars  Seated ankle 4 way AROM 10/3 R  HR/TR B 10/3 in // bars  Lat step ups 2" 10/3 L  PROM R ankle    Declined CP today.     Continue HEP daily.    Pt demo good understanding of the education provided. Patient demonstrated good return demonstration of activities.     Assessment:     Cont to require moderate UE support with standing ex's. Improving DF R ankle AROM but cont to have deficit in PF.     Pt will continue to benefit from skilled PT intervention. Medical Necessity is demonstrated by:  Requires skilled supervision to complete and progress HEP and Weakness.    Patient is making good progress towards established goals.    Plan:    Continue with established Plan of Care towards PT goals.     "

## 2018-03-28 ENCOUNTER — CLINICAL SUPPORT (OUTPATIENT)
Dept: REHABILITATION | Facility: HOSPITAL | Age: 43
End: 2018-03-28
Attending: ORTHOPAEDIC SURGERY
Payer: OTHER MISCELLANEOUS

## 2018-03-28 DIAGNOSIS — R26.9 GAIT ABNORMALITY: ICD-10-CM

## 2018-03-28 DIAGNOSIS — M19.071 ARTHRITIS OF RIGHT SUBTALAR JOINT: ICD-10-CM

## 2018-03-28 PROCEDURE — 97110 THERAPEUTIC EXERCISES: CPT | Mod: PN | Performed by: PHYSICAL THERAPIST

## 2018-03-28 NOTE — PLAN OF CARE
"TIME RECORD    Date: 03/28/2018    Start Time:  1110  Stop Time:  1200    PROCEDURES:    TIMED  Procedure Time Min.    Start:  Stop:     Start:  Stop:     Start:  Stop:     Start:  Stop:          UNTIMED  Procedure Time Min.    Start:  Stop:     Start:  Stop:      Total Timed Minutes:  50  Total Timed Units:  3  Total Untimed Units:  0  Charges Billed/# of units:  3    PHYSICAL THERAPY UPDATED PLAN OF TREATMENT    Patient name: Kiel Ayala  Onset Date:  11/20/2017  SOC Date:  2/14/2018  Primary Diagnosis:    1. Gait abnormality     2. Arthritis of right subtalar joint       Treatment Diagnosis:  Gait abnormality  Certification Period:  2/14/2018 to 3/28/2018  Precautions:  Standard  Visits from SOC:  11  Functional Level Prior to SOC:  Difficulty with ADLs, functional activities, home making and self care    Updated Assessment:    S: The patient reports right ankle pain at a 6/10 on the VAS.    O:  ROM   Left  Right  Ankle dorsiflexion 10*  6*  Ankle plantarflexion 40*  32*    MMT:   Left  Right  Ankle dorsiflexion 5/5  4/5  Ankle plantarflexion 5/5  4+/5    LEFS: 22/80 72.5% impairment    G code: CL    Bike x 10'  Standing gastroc stretches 3/30s B over 1/2 foam roll in // bars  Standing soleus stretches 3/30s B over 1/2 foam roll in // bars  SLS L/R 3/30s   Seated hamstring stretches 3/30s L/R  Seated RTB ankle DF/PF  Seated MFT A-P, 3 x 10  Minisquats 3 x 10 B in // bars  HR/TR B 3 x 10 in // bars  Lat step ups 2" 3 x 10 L      A:  The patient is progressing with increased ROM and strength    Previous Short Term Goals Status:   Goals met  1.Decrease pain x 1 grade.   2.Start HEP.    New Short Term Goals Status:     1. Increase right ankle DF to 10*  2. Decrease pain level to 4/10    Long Term Goal Status:   continue per initial plan of care.  1.Pain 0-4/10.  2. Independent HEP.   3.ROM WFL.   4.Strength >4/5.  5.Gait WNL/WFL.   6.LEFS 65/80.   7.Restore previous LI.  Reasons for Recertification of Therapy: "   Decreased ROM, strength and impaired ambulatory status    Certification Period: 4/2/2018 to 5/28/2018  Recommended Treatment Plan: 2 times per week for 6 weeks: Gait Training, Group Therapy, Manual Therapy, Moist Heat/ Ice, Patient Education, Therapeutic Activites, Therapeutic Exercise and Whirlpool/Fluidotherapy  Other Recommendations: Progress as tolerated        Therapist's Name: Easton Jorge, PT   Date: 03/28/2018    I CERTIFY THE NEED FOR THESE SERVICES FURNISHED UNDER THIS PLAN OF TREATMENT AND WHILE UNDER MY CARE    Physician's comments: ________________________________________________________________________________________________________________________________________________      Physician's Name: ___________________________________

## 2018-04-23 ENCOUNTER — CLINICAL SUPPORT (OUTPATIENT)
Dept: REHABILITATION | Facility: HOSPITAL | Age: 43
End: 2018-04-23
Attending: ORTHOPAEDIC SURGERY
Payer: OTHER MISCELLANEOUS

## 2018-04-23 DIAGNOSIS — R26.9 GAIT ABNORMALITY: ICD-10-CM

## 2018-04-23 DIAGNOSIS — S92.001G: ICD-10-CM

## 2018-04-23 DIAGNOSIS — M19.071 ARTHRITIS OF RIGHT SUBTALAR JOINT: ICD-10-CM

## 2018-04-23 DIAGNOSIS — S92.002G: ICD-10-CM

## 2018-04-23 PROCEDURE — 97110 THERAPEUTIC EXERCISES: CPT | Mod: PN

## 2018-04-23 NOTE — PROGRESS NOTES
"Name: Kiel GARCIA [facility] Number: [id number]  Date of Treatment: 04/23/2018   Diagnosis:   Encounter Diagnoses   Name Primary?    Gait abnormality     Arthritis of right subtalar joint     Closed fracture of both calcanei with delayed healing, subsequent encounter        Time in: 1305  Time Out: 1400  Total Treatment Time: 55      Subjective:    Kiel reports "I am still limping because my ankle doesn't bend the way it's supposed to because of the fusion."  Patient reports their pain to be 6/10 on a 0-10 scale with 0 being no pain and 10 being the worst pain imaginable.    Objective  Kiel received therapeutic exercises to develop strength, endurance, ROM and flexibility for 55 minutes including:   Bike x 10'  Standing gastroc stretches 3/30s B over 1/2 foam roll in // bars  Standing soleus stretches 3/30s B over 1/2 foam roll in // bars  SLS L/R 3/30s   Seated hamstring stretches 3/30s L/R  Seated RTB ankle DF/PF  Minisquats 3 x 10 B in // bars  HR/TR B 3 x 10 in // bars  Lat step ups 2" 3 x 10 R    Manual therapy to R ankle/foot in form of gentle gr 1/2 mobs.      Pt demo good understanding of the education provided. Kiel demonstrated good return demonstration of activities.     Assessment:   Pt will continue to benefit from skilled PT intervention. Medical Necessity is demonstrated by:  Continued inability to participate in vocational pursuits, Pain limits function of effected part for some activities, Requires skilled supervision to complete and progress HEP and Weakness.    Patient is making good progress towards established goals.      Plan:  Continue with established Plan of Care towards PT goals.   "

## 2018-04-24 ENCOUNTER — TELEPHONE (OUTPATIENT)
Dept: ORTHOPEDICS | Facility: CLINIC | Age: 43
End: 2018-04-24

## 2018-04-24 NOTE — TELEPHONE ENCOUNTER
----- Message from Renée Alcantar sent at 4/24/2018  9:06 AM CDT -----  Contact: Hailey Florian  with Workers [employer]   Workers Comp is needing to know when Dr Washburn will have time to do a pier to pier for patient     Please call back [phone] Opt 3 Ext [phone]

## 2018-04-24 NOTE — TELEPHONE ENCOUNTER
----- Message from Nina Mead sent at 4/24/2018  3:17 PM CDT -----  Good Afternoon,    Per Cert is calling requesting peer to peer for rafael Ayala referral from .  Please have someone contact  @ [phone](P) before 2 PM [place]  Tomorrow.    Thanks  Brit  [facility]

## 2018-04-25 ENCOUNTER — TELEPHONE (OUTPATIENT)
Dept: ORTHOPEDICS | Facility: CLINIC | Age: 43
End: 2018-04-25

## 2018-04-25 DIAGNOSIS — M19.071 ARTHRITIS OF RIGHT SUBTALAR JOINT: Primary | ICD-10-CM

## 2018-04-25 NOTE — TELEPHONE ENCOUNTER
----- Message from Nina Mead sent at 4/25/2018 11:11 AM CDT -----  Good Morning,    Per Cert is calling requesting peer to peer for patient Joon Ayala referral from .  Please have someone contact  @ Direct [phone] before 2 PM [place] time today    Thanks  Ani  [facility]

## 2018-04-26 ENCOUNTER — OFFICE VISIT (OUTPATIENT)
Dept: ORTHOPEDICS | Facility: CLINIC | Age: 43
End: 2018-04-26
Payer: OTHER MISCELLANEOUS

## 2018-04-26 ENCOUNTER — HOSPITAL ENCOUNTER (OUTPATIENT)
Dept: RADIOLOGY | Facility: HOSPITAL | Age: 43
Discharge: HOME | End: 2018-04-26
Attending: ORTHOPAEDIC SURGERY
Payer: OTHER MISCELLANEOUS

## 2018-04-26 VITALS
BODY MASS INDEX: 26.66 KG/M2 | DIASTOLIC BLOOD PRESSURE: 76 MMHG | SYSTOLIC BLOOD PRESSURE: 106 MMHG | WEIGHT: 160 LBS | HEART RATE: 86 BPM | HEIGHT: 65 IN

## 2018-04-26 DIAGNOSIS — M19.071 OSTEOARTHRITIS OF RIGHT SUBTALAR JOINT: ICD-10-CM

## 2018-04-26 DIAGNOSIS — M19.071 ARTHRITIS OF RIGHT SUBTALAR JOINT: ICD-10-CM

## 2018-04-26 DIAGNOSIS — G57.81 SURAL NEUROPATHY, RIGHT: Primary | ICD-10-CM

## 2018-04-26 PROCEDURE — 99999 PR PBB SHADOW E&M-EST. PATIENT-LVL III: CPT | Mod: PBBFAC,,, | Performed by: ORTHOPAEDIC SURGERY

## 2018-04-26 PROCEDURE — 73650 X-RAY EXAM OF HEEL: CPT | Mod: 26,RT,, | Performed by: RADIOLOGY

## 2018-04-26 PROCEDURE — 99214 OFFICE O/P EST MOD 30 MIN: CPT | Mod: S$GLB,,, | Performed by: ORTHOPAEDIC SURGERY

## 2018-04-26 PROCEDURE — 73650 X-RAY EXAM OF HEEL: CPT | Mod: TC,PO,RT

## 2018-04-26 RX ORDER — GABAPENTIN 300 MG/1
300 CAPSULE ORAL 3 TIMES DAILY
Qty: 90 CAPSULE | Refills: 6 | Status: SHIPPED | OUTPATIENT
Start: 2018-04-26 | End: 2022-07-24

## 2018-04-27 ENCOUNTER — CLINICAL SUPPORT (OUTPATIENT)
Dept: REHABILITATION | Facility: HOSPITAL | Age: 43
End: 2018-04-27
Attending: ORTHOPAEDIC SURGERY
Payer: OTHER MISCELLANEOUS

## 2018-04-27 DIAGNOSIS — M19.071 ARTHRITIS OF RIGHT SUBTALAR JOINT: ICD-10-CM

## 2018-04-27 DIAGNOSIS — R26.9 GAIT ABNORMALITY: ICD-10-CM

## 2018-04-27 PROCEDURE — 97110 THERAPEUTIC EXERCISES: CPT | Mod: PN

## 2018-04-27 NOTE — PROGRESS NOTES
"Name: Kiel GARCIA [facility] Number: [id number]  Date of Treatment: 04/27/2018   Diagnosis:   Encounter Diagnoses   Name Primary?    Gait abnormality     Arthritis of right subtalar joint        Time in: 1255  Time Out: 1350  Total Treatment Time: 55  Group Time: 0      Subjective:    Kiel reports improvement of symptoms, pain not bad.  Patient reports their pain to be 5/10 on a 0-10 scale with 0 being no pain and 10 being the worst pain imaginable.    Objective    Kiel received therapeutic exercises to develop strength, endurance, flexibility and balance for 55 minutes including:     Bike Lv3 10'  Gastroc stretch 3x30" 1/2 roll B  Soleus stretch 3x30" 1/2 roll B  HR/TR x30 Airex B  Mini squats x30 Airex B  SLS 3x30" Airex R/L  Step up R, over L and back, Green Tband oval disc stacked on Blue disc  DF stretch 10x10" in sit R  Plantar Fascia stretch 3x30"  Long sit with towel R    Pt demo good understanding of the education provided. Kiel demonstrated good return demonstration of activities.     Assessment:     Pt will continue to benefit from skilled PT intervention. Medical Necessity is demonstrated by:  Continued inability to participate in vocational pursuits, Pain limits function of effected part for some activities, Unable to participate fully in daily activities, Requires skilled supervision to complete and progress HEP and Weakness.      Plan:    Continue with established Plan of Care towards PT goals.   "

## 2018-05-02 ENCOUNTER — CLINICAL SUPPORT (OUTPATIENT)
Dept: REHABILITATION | Facility: HOSPITAL | Age: 43
End: 2018-05-02
Payer: OTHER MISCELLANEOUS

## 2018-05-02 DIAGNOSIS — R26.9 GAIT ABNORMALITY: ICD-10-CM

## 2018-05-02 DIAGNOSIS — M19.071 ARTHRITIS OF RIGHT SUBTALAR JOINT: ICD-10-CM

## 2018-05-02 PROCEDURE — 97110 THERAPEUTIC EXERCISES: CPT | Mod: PN

## 2018-05-02 NOTE — PROGRESS NOTES
"Name: Kiel Ayala  [facility] Number: [id number]  Date of Treatment: 05/02/2018   Diagnosis:   Encounter Diagnoses   Name Primary?    Gait abnormality     Arthritis of right subtalar joint        Time in: 1056  Time Out: 1202  Total Treatment Time: 66      Subjective:    Kiel reports their pain to be 5/10 on a 0-10 scale with 0 being no pain and 10 being the worst pain imaginable.     Objective    Patient received individual therapy to increase strength, ROM and flexibility:      Kiel received therapeutic exercises to develop strength, ROM and flexibility for 66 minutes including:    EFX- ramp 1 3' fwd / 3' back  Gastroc stretch- standing on 1/2 roll B 3x30"   Soleus stretch- standing on 1/2 roll B 3x30"   Heel/Toe raises- standing 30x each   SLS- on Airex R 3x30"   Squats- mini standing on Airex 30x  Step up R, over L and back, green foam disc stacked on blue foam disc R 30x  Lateral step ups- on 4" step, stepping up with R 30x  Shuttle squats- on Pebbles Interfacesle board B 62#  30x  Shuttle single leg squats- R 37#  30x  Shuttle heel raises- B  25#  20x  Resisted ankle DF- RTB  R 30x  Resisted ankle PF- RTB  R 30x  DF stretch- seated  R 3x30"   Plantar fascia stretch- longsitting with towel R 3x30"       Written Home Exercises Provided: Continue with HEP   Pt demo good understanding of the education provided. Kiel demonstrated good return demonstration of activities.     Assessment:     Pt was able to tolerate progression in therex w/o increase in pain of 5/10.  Pt continues to experience pain in R heel during exercises, pt also requires VCs to fully WB through R LE stance phase.  Decreased arm swing during ambulation remains, VC to increase arm swing and heel strike during ambulation.    Pt will continue to benefit from skilled PT intervention. Medical Necessity is demonstrated by:  Pain limits function of effected part for some activities and Unable to participate fully in daily activities.    Patient is making good " progress towards established goals.    I certify that I was present in the room directing the student in service delivery and guiding them using my skilled judgment. As the co-signing therapist I have reviewed the students documentation and am responsible for the treatment, assessment, and plan.         Plan:  Continue with established Plan of Care towards PT goals.   Assess how pt tolerated increase in therex.

## 2018-05-04 ENCOUNTER — CLINICAL SUPPORT (OUTPATIENT)
Dept: REHABILITATION | Facility: HOSPITAL | Age: 43
End: 2018-05-04
Payer: OTHER MISCELLANEOUS

## 2018-05-04 DIAGNOSIS — M19.071 ARTHRITIS OF RIGHT SUBTALAR JOINT: ICD-10-CM

## 2018-05-04 DIAGNOSIS — R26.9 GAIT ABNORMALITY: ICD-10-CM

## 2018-05-04 PROCEDURE — 97110 THERAPEUTIC EXERCISES: CPT | Mod: PN

## 2018-05-04 NOTE — PROGRESS NOTES
Name: Kiel Ayala  Date:   05/04/2018  Primary Diagnosis: .  Problem List Items Addressed This Visit     Arthritis of right subtalar joint    Gait abnormality          Time in: 0807  Time Out: 0904  Total Treatment Time: 57  Group Time: 0      Subjective:    Patient reports no change in s/s.  Patient reports their pain to be 5-6/10 on a 0-10 scale with 0 being no pain and 10 being the worst pain imaginable.    Objective    Patient received individual therapy to address strength, endurance, ROM and flexibility with 0 other patients with activities as follows:     Patient received therapeutic exercises to develop strength, endurance, ROM and flexibility for 57 minutes including:     EFX 3'/3', ramp=1  Standing gastroc stretch using 1/2 foam roll 3/30 sec B  Standing soleus stretch using 1/2 foam roll 3/30 sec B  HR/TR airex x 30  SLS airex 3/30 sec L/R  Step-ups CKC 4 inch step x 30  Minisquats on airex x 30  Step up R,over L and back, green foam disc stacked on blue foam disc x 30  Seated DF stretch 3/30 sec R  PF stretch in long seated using towel 3/30 sec R  Shuttle squats 62# c/ wobble board x 30 B, 37# single leg squats x 30 R  Resisted ankle DF c/ RTB x 30  Resisted ankle PF c/ RTB x 30    Assessment:     Tolerated treatment w/o increase in pain level. Ambulating with antalgic pattern with decreased stance time/weight shift RLE.     Pt will continue to benefit from skilled PT intervention. Medical Necessity is demonstrated by:  Continued inability to participate in vocational pursuits, Pain limits function of effected part for some activities, Unable to participate fully in daily activities, Requires skilled supervision to complete and progress HEP and Weakness.    Patient is making good progress towards established goals.    Plan:  Continue with established Plan of Care towards PT goals.

## 2018-05-04 NOTE — PROGRESS NOTES
Subjective:      Patient ID: Kiel Ayala is a 42 y.o. male.    Chief Complaint: Post-op Evaluation of the Right Foot and Post-op Evaluation (s/p HWR, sural nerve neurolysis, subtalar fusion 11/20/17)    Doing better well s/p hwr, sural nerve neurolysis and subtalar fusion.  He rates his pain as 6/10 today.  He is doing well with PT.     Social History     Occupational History    Not on file.     Social History Main Topics    Smoking status: Never Smoker    Smokeless tobacco: Never Used    Alcohol use Yes      Comment: rarely/ twice a year    Drug use: No    Sexual activity: Not on file          Objective:    Ortho Exam   RLE: Neurovascularly intact, numbness in sural nerve distribution as expected.  Incision well healed, Mild swelling.  Tenderness to palpation lateral foot.   Decreased range of motion of the ankle but improved since last visit.     XRAYS: 2 views of right calcaneus obtained and reviewed today reveal good alignment, hardware is intact.  There is interval progression of healing.       Assessment:         s/p hwr, sural nerve neurolysis and subtalar fusion    Plan:       Continue PT. F/u 8 weeks with xray calcaneus.  This was also a Rehab conference today with his  and the worker's compensation adjustor.

## 2018-05-07 ENCOUNTER — CLINICAL SUPPORT (OUTPATIENT)
Dept: REHABILITATION | Facility: HOSPITAL | Age: 43
End: 2018-05-07
Payer: OTHER MISCELLANEOUS

## 2018-05-07 DIAGNOSIS — R26.9 GAIT ABNORMALITY: ICD-10-CM

## 2018-05-07 DIAGNOSIS — M19.071 ARTHRITIS OF RIGHT SUBTALAR JOINT: ICD-10-CM

## 2018-05-07 PROCEDURE — 97110 THERAPEUTIC EXERCISES: CPT | Mod: PN

## 2018-05-07 NOTE — PROGRESS NOTES
"[id number]  Date of Treatment: 05/07/2018   Diagnosis:   Encounter Diagnoses   Name Primary?    Gait abnormality     Arthritis of right subtalar joint        Time in: 1012  Time Out: 1102  Total Treatment Time: 50      Subjective:    Kiel reports "I was throwing the ball for my dog and I think I stepped on a root and my foot went sideways."  Patient reports their pain to be 6/10 on a 0-10 scale with 0 being no pain and 10 being the worst pain imaginable.    Objective    Patient received individual therapy to increase strength and ROM with activities as follows:     Kiel received therapeutic exercises to develop strength and ROM for 50 minutes including:     EFX 3/3 ramp 2  RM retro: 2' x 2  Standing gastroc stretch 3 x 30 sec B  Soleus stretch 3 x 30 sec B  SLS on ariex 3 x 30 sec B  HR/TR on airex x 30 B  Rocker board: tandem stand rocking for/back, R in front, then L in front x 1 minute each  Rocker board R/L x 2 minutes  Seated DF stretch x 10, 15 sec hold  Shuttle: wobble board: 62# B, 37# R x 30 each    Written Home Exercises Provided: cont HEP  Pt demo good understanding of the education provided. Kiel demonstrated fair return demonstration of activities.     Assessment:     Pt ambulates with R LE abducted, foot in ER, decreased toe off.  VC to increase toe off during ambulation.  Pt stated "When I get to a certain point, I kills me."  Re: pain with toe off.    Pt will continue to benefit from skilled PT intervention. Medical Necessity is demonstrated by:  Fall Risk, Pain limits function of effected part for some activities, Unable to participate fully in daily activities, Requires skilled supervision to complete and progress HEP and Weakness.    Patient is making fair progress towards established goals.      Plan:  Continue with established Plan of Care towards PT goals.   "

## 2018-05-09 ENCOUNTER — CLINICAL SUPPORT (OUTPATIENT)
Dept: REHABILITATION | Facility: HOSPITAL | Age: 43
End: 2018-05-09
Payer: OTHER MISCELLANEOUS

## 2018-05-09 DIAGNOSIS — R26.9 GAIT ABNORMALITY: ICD-10-CM

## 2018-05-09 DIAGNOSIS — M19.071 ARTHRITIS OF RIGHT SUBTALAR JOINT: ICD-10-CM

## 2018-05-09 PROCEDURE — 97110 THERAPEUTIC EXERCISES: CPT | Mod: PN | Performed by: PHYSICAL THERAPIST

## 2018-05-09 NOTE — PROGRESS NOTES
Name: Kiel GARCIA [facility] Number: [id number]  Date of Treatment: 05/09/2018   Diagnosis:   Encounter Diagnoses   Name Primary?    Gait abnormality     Arthritis of right subtalar joint        Time in: 1100  Time Out: 1200  Total Treatment Time: 60  Group Time: 0      Subjective:    Kiel reports improvement of symptoms.  Patient reports their pain to be 2/10 on a 0-10 scale with 0 being no pain and 10 being the worst pain imaginable.    Objective    Patient received individual therapy to increase strength, endurance, ROM and flexibility with 0 patients with activities as follows:     Kiel received therapeutic exercises to develop strength, endurance, ROM and flexibility for 60 minutes including:     EFX 4/4  RM retro: .9mph x 5 min  Standing gastroc stretch 3 x 30 sec B  Soleus stretch 3 x 30 sec B  SLS on ariex 3 x 30 sec B  HR/TR on airex x 30 B  Rocker board: tandem stand rocking 3 x 10  Rocker board R/L  3 x 10  Seated DF stretch x 10, 15 sec hold  Shuttle: Immunomedicsle board: 62# B, 37# R x 30 each      Assessment:       Pt will continue to benefit from skilled PT intervention. Medical Necessity is demonstrated by:  Unable to participate fully in daily activities.    Patient is making good progress towards established goals.    New/Revised goals: na      Plan:  Continue with established Plan of Care towards PT goals.

## 2018-05-15 ENCOUNTER — CLINICAL SUPPORT (OUTPATIENT)
Dept: REHABILITATION | Facility: HOSPITAL | Age: 43
End: 2018-05-15
Payer: OTHER MISCELLANEOUS

## 2018-05-15 DIAGNOSIS — R26.9 GAIT ABNORMALITY: ICD-10-CM

## 2018-05-15 DIAGNOSIS — M19.071 ARTHRITIS OF RIGHT SUBTALAR JOINT: ICD-10-CM

## 2018-05-15 PROCEDURE — 97110 THERAPEUTIC EXERCISES: CPT | Mod: PN

## 2018-05-15 NOTE — PROGRESS NOTES
"Name: Kiel GARCIA [facility] Number: [id number]  Date of Treatment: 05/15/2018   Diagnosis:   Encounter Diagnoses   Name Primary?    Gait abnormality     Arthritis of right subtalar joint        Time in: 1508  Time Out: 1600  Total Treatment Time: 52        Subjective:    Kiel reports "I feel like I am starting to WB more evenly, and start to out my heels down first with walking then push off with my toes."  Patient reports their pain to be 5/10 on a 0-10 scale with 0 being no pain and 10 being the worst pain imaginable.    Objective  Kiel received therapeutic exercises to develop strength, endurance, ROM and flexibility for 52 minutes including:    EFX 5/5 RE 5    RM retro: .9mph x 5 min    Standing Gastroc stretch 3 x 30 sec B   Standing Soleus stretch 3 x 30 sec B   SLS on ariex 3 x 30 sec B   HR/TR on airex x 30 B   Rocker board: tandem stand rocking 3 x 10   Rocker board R/L  3 x 10   Seated DF stretch x 10, 15 sec hold   Shuttle: [employer] board: 62# B, 37# R x 30 each      Pt demo good understanding of the education provided. Kiel demonstrated good return demonstration of activities.     Assessment:   Patient remains with decreased DF, ankle mobility, decreased strength and WB in R ankle compared to contralateral    Pt will continue to benefit from skilled PT intervention. Medical Necessity is demonstrated by:  Requires skilled supervision to complete and progress HEP and Weakness.    Patient is making good progress towards established goals.    Plan:  Continue with established Plan of Care towards PT goals.   "

## 2018-05-16 ENCOUNTER — CLINICAL SUPPORT (OUTPATIENT)
Dept: REHABILITATION | Facility: HOSPITAL | Age: 43
End: 2018-05-16
Attending: ORTHOPAEDIC SURGERY
Payer: OTHER MISCELLANEOUS

## 2018-05-16 DIAGNOSIS — R26.9 GAIT ABNORMALITY: ICD-10-CM

## 2018-05-16 DIAGNOSIS — M19.071 ARTHRITIS OF RIGHT SUBTALAR JOINT: ICD-10-CM

## 2018-05-16 PROCEDURE — 97110 THERAPEUTIC EXERCISES: CPT | Mod: PN | Performed by: PHYSICAL THERAPIST

## 2018-05-16 NOTE — PROGRESS NOTES
Name: Kiel GARCIA [facility] Number: [id number]  Date of Treatment: 05/16/2018   Diagnosis:   Encounter Diagnoses   Name Primary?    Gait abnormality     Arthritis of right subtalar joint        Time in: 1120  Time Out: 1200  Total Treatment Time: 40  Group Time: 0      Subjective:    Kiel reports improvement of symptoms.  Patient reports their pain to be 4/10 on a 0-10 scale with 0 being no pain and 10 being the worst pain imaginable.    Objective    Patient received individual therapy to increase strength, endurance, ROM and flexibility with 0 patients with activities as follows:     Kiel received therapeutic exercises to develop strength, endurance, ROM and flexibility for 40 minutes including:    EFX 5/5  RM retro: .9mph x 5 min  Standing gastroc stretch 3 x 30 sec B  Soleus stretch 3 x 30 sec B  SLS on ariex 3 x 30 sec B  HR/TR on airex x 30 B  Shuttle: wobble board: 62# B, 37# R x 30 each    Assessment:       Pt will continue to benefit from skilled PT intervention. Medical Necessity is demonstrated by:  Unable to participate fully in daily activities.    Patient is making good progress towards established goals.    New/Revised goals: na      Plan:  Continue with established Plan of Care towards PT goals.

## 2018-05-25 ENCOUNTER — CLINICAL SUPPORT (OUTPATIENT)
Dept: REHABILITATION | Facility: HOSPITAL | Age: 43
End: 2018-05-25
Attending: ORTHOPAEDIC SURGERY
Payer: OTHER MISCELLANEOUS

## 2018-05-25 DIAGNOSIS — R26.9 GAIT ABNORMALITY: ICD-10-CM

## 2018-05-25 DIAGNOSIS — M19.071 ARTHRITIS OF RIGHT SUBTALAR JOINT: ICD-10-CM

## 2018-05-25 PROCEDURE — 97110 THERAPEUTIC EXERCISES: CPT | Mod: PN

## 2018-05-25 NOTE — PROGRESS NOTES
Name: Kiel Ayala  Clinic Number: [id number]  Date of Treatment: 05/25/2018   Diagnosis:   Encounter Diagnoses   Name Primary?    Gait abnormality     Arthritis of right subtalar joint        Time in: 1300  Time Out: 1355  Total Treatment Time: 55  Group Time: NA      Subjective:    Kiel reports no real change in pain levels.  Patient reports their pain to be 5/10 on a 0-10 scale with 0 being no pain and 10 being the worst pain imaginable.    Objective    Kiel received therapeutic exercises to develop strength, endurance and flexibility for 55 minutes including:     X 10 min bike (L3)  3 x 30 sec gastroc stretch  3 x 30 sec soleus stretch  X 30 AirEx HR/TR  3 x 30 sec sonal. SLS on AirEx  X 30 up/down 2-in step  X 2 min BOSU (flat)  4 x 15 ft heel-toe on AirEx  X 20 sonal. t-band there ex (GTB) = DF/PF, inv/ev    Assessment:     Mr. Ayala was able to jorge. tx session w/out increase in symptoms.    Pt will continue to benefit from skilled PT intervention. Medical Necessity is demonstrated by:  Pain limits function of effected part for some activities, Unable to participate fully in daily activities and Weakness.    Patient is making fair progress towards established goals.    New/Revised goals: NA      Plan:  Continue with established Plan of Care towards PT goals.

## 2018-05-28 ENCOUNTER — CLINICAL SUPPORT (OUTPATIENT)
Dept: REHABILITATION | Facility: HOSPITAL | Age: 43
End: 2018-05-28
Payer: OTHER MISCELLANEOUS

## 2018-05-28 DIAGNOSIS — M19.071 ARTHRITIS OF RIGHT SUBTALAR JOINT: ICD-10-CM

## 2018-05-28 DIAGNOSIS — R26.9 GAIT ABNORMALITY: ICD-10-CM

## 2018-05-28 PROCEDURE — 97110 THERAPEUTIC EXERCISES: CPT | Mod: PN | Performed by: PHYSICAL THERAPIST

## 2018-05-28 NOTE — PLAN OF CARE
TIME RECORD    Date: 05/28/2018    Start Time:  1100  Stop Time:  1200    PROCEDURES:    TIMED  Procedure Time Min.    Start:  Stop:     Start:  Stop:     Start:  Stop:     Start:  Stop:          UNTIMED  Procedure Time Min.    Start:  Stop:     Start:  Stop:      Total Timed Minutes:  60  Total Timed Units:  4  Total Untimed Units:  0  Charges Billed/# of units:  4    PHYSICAL THERAPY UPDATED PLAN OF TREATMENT    Patient name: Kiel Ayala  Onset Date:  11/20/2017  SOC Date:  2/14/2018  Primary Diagnosis:    1. Gait abnormality     2. Arthritis of right subtalar joint       Treatment Diagnosis:  Gait abnormality  Certification Period:  4/2/2018 to 5/28/2018  Precautions:  Standard  Visits from SOC:  21  Functional Level Prior to SOC:  Difficulty with ADLs, functional activities home making and self care     Updated Assessment:    S: The patient reports continued but slow improvement. He rated his pain at 5/10.    O:  ROM                            Left                  Right  Ankle dorsiflexion        10*                   8*  Ankle plantarflexion     40*                   32*     MMT:                           Left                  Right  Ankle dorsiflexion        5/5                   4+/5  Ankle plantarflexion     5/5                   4+/5     LEFS:  29/80   63.8% impairment     G code: CL      A:The patient is progress toward established goals at this time.    Previous Short Term Goals Status:   Progressing toward  1. Increase right ankle DF to 10*  2. Decrease pain level to 4/10    New Short Term Goals Status:   Continue with previous STGs.    Long Term Goal Status:   continue per initial plan of care.  1.Pain 0-4/10.  2. Independent HEP.   3.ROM WFL.   4.Strength >4/5.  5.Gait WNL/WFL.   6.LEFS 65/80.   7.Restore previous LI.    Reasons for Recertification of Therapy:    Decreased ROM, strength and impaired ambulatory status    Certification Period: 5/30/2018 to 7/20/2018  Recommended Treatment Plan: 2  times per week for 6 weeks: Gait Training, Group Therapy, Manual Therapy, Moist Heat/ Ice, Patient Education, Therapeutic Activites, Therapeutic Exercise and Other Dry needling  Other Recommendations: Progress as tolerated        Therapist's Name: Easton Jorge, PT   Date: 05/28/2018    I CERTIFY THE NEED FOR THESE SERVICES FURNISHED UNDER THIS PLAN OF TREATMENT AND WHILE UNDER MY CARE    Physician's comments: ________________________________________________________________________________________________________________________________________________      Physician's Name: ___________________________________

## 2018-05-28 NOTE — PROGRESS NOTES
Name: Kiel GARCIA [facility] Number: [id number]  Date of Treatment: 05/28/2018   Diagnosis:   Encounter Diagnoses   Name Primary?    Gait abnormality     Arthritis of right subtalar joint        Time in: 1100  Time Out: 1200  Total Treatment Time: 60  Group Time: 0      Subjective:    Kiel reports improvement of symptoms.  Patient reports their pain to be 5/10 on a 0-10 scale with 0 being no pain and 10 being the worst pain imaginable.    Objective    Patient received individual therapy to increase strength, endurance, ROM and flexibility with 0 patients with activities as follows:     Kiel received therapeutic exercises to develop strength, endurance, ROM and flexibility for 60 minutes including:     EFX 5/5  RM retro: .9mph x 5 min  Standing gastroc stretch 3 x 30 sec B  Soleus stretch 3 x 30 sec B  SLS on ariex 3 x 30 sec B  HR/TR on airex x 30 B  Mini squats 3 x 10  LSU 3 x 10  Shuttle leg press 3 x 10 62#  Shuttle single leg press 3 x 10 37#  BOSU (flat) DF/PF and lateral    Assessment:       Pt will continue to benefit from skilled PT intervention. Medical Necessity is demonstrated by:  Unable to participate fully in daily activities and Weakness.    Patient is making good progress towards established goals.    New/Revised goals: See updated plan of care      Plan:  Continue with established Plan of Care towards PT goals.

## 2018-06-07 ENCOUNTER — CLINICAL SUPPORT (OUTPATIENT)
Dept: REHABILITATION | Facility: HOSPITAL | Age: 43
End: 2018-06-07
Attending: ORTHOPAEDIC SURGERY
Payer: OTHER MISCELLANEOUS

## 2018-06-07 DIAGNOSIS — M19.071 ARTHRITIS OF RIGHT SUBTALAR JOINT: ICD-10-CM

## 2018-06-07 DIAGNOSIS — S92.001A CLOSED FRACTURE OF BOTH CALCANEI, INITIAL ENCOUNTER: ICD-10-CM

## 2018-06-07 DIAGNOSIS — S92.002A CLOSED FRACTURE OF BOTH CALCANEI, INITIAL ENCOUNTER: ICD-10-CM

## 2018-06-07 DIAGNOSIS — R26.9 GAIT ABNORMALITY: ICD-10-CM

## 2018-06-07 PROCEDURE — 97110 THERAPEUTIC EXERCISES: CPT | Mod: PN

## 2018-06-07 NOTE — PROGRESS NOTES
"[id number]  Date of Treatment: 06/07/2018   Diagnosis:   Encounter Diagnoses   Name Primary?    Gait abnormality     Arthritis of right subtalar joint     Closed fracture of both calcanei, initial encounter        Time in: 1547  Time Out: 1638  Total Treatment Time: 50      Subjective:    Kiel reports "It's getting better.  I'm walking better".  Patient reports their pain to be 5/10 on a 0-10 scale with 0 being no pain and 10 being the worst pain imaginable.    Objective    Patient received individual therapy to increase strength and ROM with activities as follows:     Kiel received therapeutic exercises to develop strength, ROM and flexibility for 50 minutes including:     Bike x 10 minutes  Standing gastroc stretch 3 x 30 sec B  Soleus stretch 3 x 30 sec B  HR/TR on airex x 30  SLS on airex 3 x 30 sec B  Mini squats on airex x 30  Lateral step up 6" x 30 R  BOSU DF/PF, R/L 2 minutes each  Shuttle:  62# B, 37# L x 30 each  TM retro: 2' w/o belt running, 2' with belt on  Heel walk 15'  Toe walk 15'    Written Home Exercises Provided: cont HEP  Pt demo good understanding of the education provided. Kiel demonstrated good return demonstration of activities.     Assessment:     Decreased toe off LLE remains.  Pt able to walk on toes while maintaining heel raise.  Pt will continue to benefit from skilled PT intervention. Medical Necessity is demonstrated by:  Pain limits function of effected part for some activities, Unable to participate fully in daily activities, Requires skilled supervision to complete and progress HEP and Weakness.    Patient is making good progress towards established goals.      Plan:  Continue with established Plan of Care towards PT goals.   "

## 2018-06-12 ENCOUNTER — CLINICAL SUPPORT (OUTPATIENT)
Dept: REHABILITATION | Facility: HOSPITAL | Age: 43
End: 2018-06-12
Payer: OTHER MISCELLANEOUS

## 2018-06-12 DIAGNOSIS — R26.9 GAIT ABNORMALITY: ICD-10-CM

## 2018-06-12 DIAGNOSIS — M19.071 ARTHRITIS OF RIGHT SUBTALAR JOINT: ICD-10-CM

## 2018-06-12 PROCEDURE — 97110 THERAPEUTIC EXERCISES: CPT | Mod: PN

## 2018-06-12 NOTE — PROGRESS NOTES
"Name: Kiel GARCIA [facility] [place] Number: [id number]  Date of Treatment: 06/12/2018   Diagnosis:   Encounter Diagnoses   Name Primary?    Gait abnormality     Arthritis of right subtalar joint        Time in: 1350  Time Out: 1445  Total Treatment Time: 55  Group Time: 0      Subjective:    Kiel reports improvement of symptoms.  Patient reports their pain to be 5/10 on a 0-10 scale with 0 being no pain and 10 being the worst pain imaginable.    Objective    Kiel received therapeutic exercises to develop strength, endurance and flexibility for 55 minutes including:     Bike Lv5 10'  Gastroc stretch 3x30" 1/2 roll R/L  Soleus stretch 3x30" 1/2 roll B  HR 2" box x30  BOSU flat x30: A/P, lateral  Up/Down 21x1 steps step through  Treadmill 5' @1.4 mph, incline at 5  Shuttle: 62# with wobble B squats, 37# U squat with wobble R/L, 37# heel dips x30    Pt demo good understanding of the education provided. Kiel demonstrated good return demonstration of activities.     Assessment:     Pt will continue to benefit from skilled PT intervention. Medical Necessity is demonstrated by:  Continued inability to participate in vocational pursuits, Pain limits function of effected part for some activities, Unable to participate fully in daily activities, Requires skilled supervision to complete and progress HEP and Weakness.    Plan:    Continue with established Plan of Care towards PT goals.   "

## 2018-06-14 ENCOUNTER — CLINICAL SUPPORT (OUTPATIENT)
Dept: REHABILITATION | Facility: HOSPITAL | Age: 43
End: 2018-06-14
Payer: OTHER MISCELLANEOUS

## 2018-06-14 DIAGNOSIS — R26.9 GAIT ABNORMALITY: ICD-10-CM

## 2018-06-14 DIAGNOSIS — M19.071 ARTHRITIS OF RIGHT SUBTALAR JOINT: ICD-10-CM

## 2018-06-14 PROCEDURE — 97110 THERAPEUTIC EXERCISES: CPT | Mod: PN

## 2018-06-14 NOTE — PROGRESS NOTES
"Name: Kiel GARCIA [facility] Number: [id number]  Date of Treatment: 06/14/2018   Diagnosis:   Encounter Diagnoses   Name Primary?    Gait abnormality     Arthritis of right subtalar joint        Time in: 1503  Time Out: 1556  Total Treatment Time: 53      Subjective:    Kiel reports doing well.  No significant changes.  Patient reports their pain to be 5/10 on a 0-10 scale with 0 being no pain and 10 being the worst pain imaginable.    Objective    Patient received individual therapy to increase strength, ROM and flexibility with activities as follows:     Kiel received therapeutic exercises to develop strength, ROM and flexibility for 53 minutes including:     EFX 5/5  R HR x 30  SLS on airex 3 x 30 sec  BOSU flat side df/pf, r/l x 30 each direction  Mini squats on rocker board x 30  Ball toss RLE 1" x 2  Heel walk x 15'  Toe walk x 15'  Shuttle: 68# B, 37# R x 30 each  TM:  Retro w/o belt running x 2 minutes, w/belt running x 4 minutes    Written Home Exercises Provided: cont HEP  Pt demo good understanding of the education provided. Kiel demonstrated good return demonstration of activities.     Assessment:     Increased toe off during ambulation today.  Good tolerance for additional activity.  Difficulty maintaining SLS with ball toss.  Difficulty with heel/toe walk.  Pt will continue to benefit from skilled PT intervention. Medical Necessity is demonstrated by:  Fall Risk, Pain limits function of effected part for some activities, Unable to participate fully in daily activities, Requires skilled supervision to complete and progress HEP, Weakness and Edema.    Patient is making good progress towards established goals.      Plan:  Continue with established Plan of Care towards PT goals.   "

## 2018-06-19 ENCOUNTER — CLINICAL SUPPORT (OUTPATIENT)
Dept: REHABILITATION | Facility: HOSPITAL | Age: 43
End: 2018-06-19
Attending: ORTHOPAEDIC SURGERY
Payer: OTHER MISCELLANEOUS

## 2018-06-19 DIAGNOSIS — M19.071 ARTHRITIS OF RIGHT SUBTALAR JOINT: ICD-10-CM

## 2018-06-19 DIAGNOSIS — R26.9 GAIT ABNORMALITY: ICD-10-CM

## 2018-06-19 PROCEDURE — 97110 THERAPEUTIC EXERCISES: CPT | Mod: PN | Performed by: PHYSICAL THERAPIST

## 2018-06-19 NOTE — PROGRESS NOTES
Name: Kiel GARCIA [facility] Number: [id number]  Date of Treatment: 06/19/2018   Diagnosis:   Encounter Diagnoses   Name Primary?    Gait abnormality     Arthritis of right subtalar joint        Time in: 1700  Time Out: 1800  Total Treatment Time: 60  Group Time: 0      Subjective:    Kiel reports improvement of symptoms.  Patient reports their pain to be 2/10 on a 0-10 scale with 0 being no pain and 10 being the worst pain imaginable.    Objective    Patient received individual therapy to increase strength, endurance, ROM and flexibility with 0 patients with activities as follows:     Kiel received therapeutic exercises to develop strength, endurance, ROM and flexibility for 60 minutes including:     EFX 5/5  RM retro: .9mph x 5 min  Standing gastroc stretch 3 x 30 sec B  Soleus stretch 3 x 30 sec B  SLS on ariex 3 x 30 sec B  HR/TR on airex x 30 B  Mini squats 3 x 10  LSU 3 x 10  Shuttle leg press 3 x 10 62#  Shuttle calf press 3 x 10 62#  Shuttle single leg press 3 x 10 37#  BOSU (flat) DF/PF and lateral  Rocker board DF/PF 3 x 10      Assessment:       Pt will continue to benefit from skilled PT intervention. Medical Necessity is demonstrated by:  Requires skilled supervision to complete and progress HEP and Weakness.    Patient is making good progress towards established goals.    New/Revised goals: na      Plan:  Continue with established Plan of Care towards PT goals.

## 2018-06-20 DIAGNOSIS — M19.071 OSTEOARTHRITIS OF RIGHT SUBTALAR JOINT: Primary | ICD-10-CM

## 2018-06-21 ENCOUNTER — CLINICAL SUPPORT (OUTPATIENT)
Dept: REHABILITATION | Facility: HOSPITAL | Age: 43
End: 2018-06-21
Payer: OTHER MISCELLANEOUS

## 2018-06-21 ENCOUNTER — OFFICE VISIT (OUTPATIENT)
Dept: ORTHOPEDICS | Facility: CLINIC | Age: 43
End: 2018-06-21
Payer: OTHER MISCELLANEOUS

## 2018-06-21 ENCOUNTER — HOSPITAL ENCOUNTER (OUTPATIENT)
Dept: RADIOLOGY | Facility: HOSPITAL | Age: 43
Discharge: HOME | End: 2018-06-21
Attending: ORTHOPAEDIC SURGERY
Payer: OTHER MISCELLANEOUS

## 2018-06-21 VITALS
WEIGHT: 160 LBS | DIASTOLIC BLOOD PRESSURE: 64 MMHG | BODY MASS INDEX: 26.66 KG/M2 | SYSTOLIC BLOOD PRESSURE: 108 MMHG | HEART RATE: 78 BPM | HEIGHT: 65 IN

## 2018-06-21 DIAGNOSIS — R26.9 GAIT ABNORMALITY: ICD-10-CM

## 2018-06-21 DIAGNOSIS — G57.81 SURAL NEUROPATHY, RIGHT: Primary | ICD-10-CM

## 2018-06-21 DIAGNOSIS — M19.071 OSTEOARTHRITIS OF RIGHT SUBTALAR JOINT: Primary | ICD-10-CM

## 2018-06-21 DIAGNOSIS — M19.071 OSTEOARTHRITIS OF RIGHT SUBTALAR JOINT: ICD-10-CM

## 2018-06-21 DIAGNOSIS — S92.002D CLOSED FRACTURE OF BOTH CALCANEI WITH ROUTINE HEALING, SUBSEQUENT ENCOUNTER: ICD-10-CM

## 2018-06-21 DIAGNOSIS — S92.001D CLOSED FRACTURE OF BOTH CALCANEI WITH ROUTINE HEALING, SUBSEQUENT ENCOUNTER: ICD-10-CM

## 2018-06-21 DIAGNOSIS — M19.071 ARTHRITIS OF RIGHT SUBTALAR JOINT: ICD-10-CM

## 2018-06-21 PROCEDURE — 73650 X-RAY EXAM OF HEEL: CPT | Mod: 26,RT,, | Performed by: RADIOLOGY

## 2018-06-21 PROCEDURE — 99214 OFFICE O/P EST MOD 30 MIN: CPT | Mod: S$GLB,,, | Performed by: ORTHOPAEDIC SURGERY

## 2018-06-21 PROCEDURE — 97140 MANUAL THERAPY 1/> REGIONS: CPT | Mod: PN

## 2018-06-21 PROCEDURE — 97110 THERAPEUTIC EXERCISES: CPT | Mod: PN

## 2018-06-21 PROCEDURE — 99999 PR PBB SHADOW E&M-EST. PATIENT-LVL III: CPT | Mod: PBBFAC,,, | Performed by: ORTHOPAEDIC SURGERY

## 2018-06-21 PROCEDURE — 73650 X-RAY EXAM OF HEEL: CPT | Mod: TC,PO,RT

## 2018-06-21 NOTE — PROGRESS NOTES
"Name: Kiel GARCIA [facility] Number: [id number]  Date of Treatment: 06/21/2018   Diagnosis:   Encounter Diagnoses   Name Primary?    Gait abnormality     Arthritis of right subtalar joint     Closed fracture of both calcanei with routine healing, subsequent encounter        Time in: 1300  Time Out: 1355  Total Treatment Time: 55      Subjective:    Kiel reports pain in B LE/feet.  Patient reports their pain to be 5/10 on a 0-10 scale with 0 being no pain and 10 being the worst pain imaginable.    Objective    Patient received individual therapy to increase strength, endurance, ROM and flexibility with 0 patients with activities as follows:     Kiel received therapeutic exercises to develop strength, endurance, ROM and flexibility for 45 minutes including:     EFX 5/5  RM retro: .9mph x 5 min  Standing gastroc stretch 3 x 30 sec B  Soleus stretch 3 x 30 sec B  SLS on airex 3 x 30 sec B  HR/TR on airex x 30 B  Mini squats 3 x 10  LSU 3 x 10 6" step R LE  Shuttle leg press 3 x 10 62#  Shuttle calf press 3 x 10 62#  Shuttle single leg press 3 x 10 37#  BOSU (flat) DF/PF and lateral     Kiel received the following manual therapy techniques: Soft tissue Mobilization and scar mobs were applied to the: R foot/LE for 10 minutes.         Pt demo good understanding of the education provided. Kiel demonstrated good return demonstration of activities.     Assessment:   Increased tenderness/sensitivity distal heel with palpation.  Pt reports this is all the time.  Instructed pt on desensitization techniques to perform at home to this area.    Pt will continue to benefit from skilled PT intervention. Medical Necessity is demonstrated by:  Pain limits function of effected part for some activities, Unable to participate fully in daily activities, Requires skilled supervision to complete and progress HEP and Weakness.    Patient is making good progress towards established goals.    Plan:  Continue with established Plan of Care " towards PT goals.

## 2018-06-26 ENCOUNTER — CLINICAL SUPPORT (OUTPATIENT)
Dept: REHABILITATION | Facility: HOSPITAL | Age: 43
End: 2018-06-26
Attending: ORTHOPAEDIC SURGERY
Payer: OTHER MISCELLANEOUS

## 2018-06-26 DIAGNOSIS — S92.001D CLOSED FRACTURE OF BOTH CALCANEI WITH ROUTINE HEALING, SUBSEQUENT ENCOUNTER: ICD-10-CM

## 2018-06-26 DIAGNOSIS — S92.002D CLOSED FRACTURE OF BOTH CALCANEI WITH ROUTINE HEALING, SUBSEQUENT ENCOUNTER: ICD-10-CM

## 2018-06-26 DIAGNOSIS — M19.071 ARTHRITIS OF RIGHT SUBTALAR JOINT: ICD-10-CM

## 2018-06-26 DIAGNOSIS — R26.9 GAIT ABNORMALITY: ICD-10-CM

## 2018-06-26 PROCEDURE — 97110 THERAPEUTIC EXERCISES: CPT | Mod: PN

## 2018-06-26 NOTE — PROGRESS NOTES
Name: Kiel GARCIA [facility] Number: [id number]  Date of Treatment: 06/26/2018   Diagnosis:   Encounter Diagnoses   Name Primary?    Gait abnormality     Arthritis of right subtalar joint     Closed fracture of both calcanei with routine healing, subsequent encounter        Time in: 1500  Time Out: 1550  Total Treatment Time: 50      Subjective:    Kiel reports no significant changes.  Patient reports their pain to be 5/10 on a 0-10 scale with 0 being no pain and 10 being the worst pain imaginable.    Objective    Patient received individual therapy to increase strength and flexibility with activities as follows:     Kiel received therapeutic exercises to develop strength and flexibility for 50 minutes including:     EFX 5/5  R HR x 30  SLS on rocker board: med/lat x 1 minute, for/back x 1 minute  Staggered stance rocking for/back, R in front, L in front x 1 minute each  Mini squats on rocker board x 30  Perturbations: green thera tubing: R, L, rows x 30 sec each  Heel walk x 20'  Toe walk x 20'  Shuttle: 75# B, 50# R x 30 each  TM:  Retro w/o belt running x 2 minutes, w/belt running x 3 minutes  Walking lunges 30'  Pulleys: strap on L, 2 plates, hip 4 way x 30 each    Written Home Exercises Provided: cont HEP  Pt demo good understanding of the education provided. Kiel demonstrated good return demonstration of activities.     Assessment:     Pt is making improvement with ankle ROM, toe off and heel strike.  Pt will continue to benefit from skilled PT intervention. Medical Necessity is demonstrated by:  Fall Risk, Pain limits function of effected part for some activities, Unable to participate fully in daily activities, Requires skilled supervision to complete and progress HEP and Weakness.    Patient is making good progress towards established goals.      Plan:  Continue with established Plan of Care towards PT goals.

## 2018-06-28 ENCOUNTER — CLINICAL SUPPORT (OUTPATIENT)
Dept: REHABILITATION | Facility: HOSPITAL | Age: 43
End: 2018-06-28
Payer: OTHER MISCELLANEOUS

## 2018-06-28 DIAGNOSIS — S92.001D CLOSED FRACTURE OF BOTH CALCANEI WITH ROUTINE HEALING, SUBSEQUENT ENCOUNTER: ICD-10-CM

## 2018-06-28 DIAGNOSIS — S92.002D CLOSED FRACTURE OF BOTH CALCANEI WITH ROUTINE HEALING, SUBSEQUENT ENCOUNTER: ICD-10-CM

## 2018-06-28 PROBLEM — R26.9 GAIT ABNORMALITY: Status: RESOLVED | Noted: 2018-02-14 | Resolved: 2018-06-28

## 2018-06-28 PROBLEM — M19.071 ARTHRITIS OF RIGHT SUBTALAR JOINT: Status: RESOLVED | Noted: 2018-02-14 | Resolved: 2018-06-28

## 2018-06-28 PROCEDURE — 97110 THERAPEUTIC EXERCISES: CPT | Mod: PN

## 2018-06-28 NOTE — PROGRESS NOTES
Subjective:      Patient ID: Kiel Ayala is a 42 y.o. male.    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  He rates his pain as 4/10 today.  He is doing well with PT. He says he is improving but he still cannot stand or walk for long periods of time. He saw Dr. Rae who recommended he try duloxetine but he said it made him dizzy and nauseas when he took it.     Social History     Occupational History    Not on file.     Social History Main Topics    Smoking status: Never Smoker    Smokeless tobacco: Never Used    Alcohol use Yes      Comment: rarely/ twice a year    Drug use: No    Sexual activity: Not on file          Objective:    Ortho Exam   RLE: Neurovascularly intact, numbness in sural nerve distribution as expected.  Incision well healed, Mild swelling.  Tenderness to palpation lateral foot in area of the incision.  Improving range of motion of the ankle. He still has some tightness in the achilles.     XRAYS: 2 views of right calcaneus obtained and reviewed today reveal good alignment, hardware is intact.  There is interval progression of healing.       Assessment:         s/p hwr, sural nerve neurolysis and subtalar fusion    Plan:       Continue PT.  F/u 8 weeks with xray calcaneus.  We discussed that his MMI would most likely be 1 year from his last procedure and FCE may need to be done.

## 2018-06-28 NOTE — PROGRESS NOTES
"Name: Kiel GARCIA [facility] Number: [id number]  Date of Treatment: 06/28/2018   Diagnosis:   Encounter Diagnosis   Name Primary?    Closed fracture of both calcanei with routine healing, subsequent encounter        Time in: 1600  Time Out: 1658  Total Treatment Time: 58  Group Time: no       Subjective:    Kiel reports improvement of symptoms and decreased pain.  Patient reports their pain to be 4/10 on a 0-10 scale with 0 being no pain and 10 being the worst pain imaginable. Patient states if he doesn't do too much the pain is less but if he is on his feet too much then the pain get higher 5-6/10 and more. But overall he states his foot is getting better.    Objective    Kiel received therapeutic exercises to develop strength, endurance and flexibility for 58 minutes including:    EFT 5/5 Ramp 5  Gastroc/Soleus stretch 3/30" ea  HR B x 30  Rock Board FWD/Back with feet staggered R in front of L and then alternate x 1 min ea  Mini squats on rocker board x 30  Perturbations rows R/L x 30 ea with green tubing   Heel walking 20'  Toe walking 20'  Shuttle 75# B x 30  Shuttle 50# R x 30  TM retro without belt running x 2 min  TM with belt running x 3 min  Walking lunges 30'  Hip 4-way using pulleys with 2 plates x 30 ea      Written Home Exercises Provided: continue with current  Pt demo good understanding of the education provided. Kiel demonstrated good return demonstration of activities.     Assessment:     Patient was able to ambulate 60' without VC and maintain a normal gait pattern with toe off before swing phase.  Pt will continue to benefit from skilled PT intervention. Medical Necessity is demonstrated by:  Requires skilled supervision to complete and progress HEP and Weakness.    Patient is making fair progress towards established goals.    Plan:  Continue with established Plan of Care towards PT goals.   "

## 2018-07-03 ENCOUNTER — TELEPHONE (OUTPATIENT)
Dept: ORTHOPEDICS | Facility: CLINIC | Age: 43
End: 2018-07-03

## 2018-07-03 NOTE — TELEPHONE ENCOUNTER
----- Message from Nina Mead sent at 7/3/2018  2:41 PM CDT -----  Good Afternoon,    Please have someone contact  @ [phone](P) for peer to peer on patient's therapy referral. Peer to peer request expires 7/5/18 9AM Central Standard time.    Thanks  Brit  [facility]

## 2018-07-05 ENCOUNTER — TELEPHONE (OUTPATIENT)
Dept: ORTHOPEDICS | Facility: CLINIC | Age: 43
End: 2018-07-05

## 2018-07-05 NOTE — TELEPHONE ENCOUNTER
----- Message from Noreen Sheffield MA sent at 7/5/2018 10:54 AM CDT -----  Contact: Jovanni//Dr. Francois Baig is calling to set up a Peer to Peer with Dr. Washburn. I informed him that it would be next week before she is back in town. The cell phone listed below is Dr. Parrish's.    Call Back# [phone]  Thanks

## 2018-07-09 ENCOUNTER — CLINICAL SUPPORT (OUTPATIENT)
Dept: REHABILITATION | Facility: HOSPITAL | Age: 43
End: 2018-07-09
Payer: OTHER MISCELLANEOUS

## 2018-07-09 DIAGNOSIS — S92.001D CLOSED FRACTURE OF BOTH CALCANEI WITH ROUTINE HEALING, SUBSEQUENT ENCOUNTER: ICD-10-CM

## 2018-07-09 DIAGNOSIS — S92.002D CLOSED FRACTURE OF BOTH CALCANEI WITH ROUTINE HEALING, SUBSEQUENT ENCOUNTER: ICD-10-CM

## 2018-07-09 PROCEDURE — 97110 THERAPEUTIC EXERCISES: CPT | Mod: PN

## 2018-07-09 NOTE — PROGRESS NOTES
"Name: Kiel GARCIA [facility] Number: [id number]  Date of Treatment: 07/09/2018   Diagnosis:   Encounter Diagnosis   Name Primary?    Closed fracture of both calcanei with routine healing, subsequent encounter        Time in: 1010  Time Out: 1100  Total Treatment Time: 50    Subjective:    Kiel reports improvement of symptoms. Pt states "I went to the dr to be fitted for insoles, but the dr said I needed orthopedic shoes for my R foot especially. I am getting better and I almost have full range of motion up and down."  Patient reports their pain to be 4-5/10 on a 0-10 scale with 0 being no pain and 10 being the worst pain imaginable.    Objective    Patient received individual therapy to increase strength, endurance, ROM and flexibility with 0 patients with activities as follows:     Kiel received therapeutic exercises to develop strength, endurance, ROM and flexibility for 50 minutes including:   EFX 5'/5' RE/RA 5  Gastroc stretch 3/30s B on 1/2 foam roll  Soleus stretch 3/30s B on 1/2 foam roll  A/P rock with R/L fwd 1' each on rocker board  Mini squats on rockerboard 3/10  HR with 1/2 roll under L foot to provide an unstable, higher surface to challenge R foot  SLS R 3/30s on firm surface   Perturbations R 3x30" with GTB  Heel Walk 30 ft  Toe Walk 30 ft  Shuttle 75# B, 50# R x 30  Retro walking on treadmill against resistance of treadmill being turned off 3'  Walking Lunges 30 ft    Written Home Exercises Provided: Cont with HEP.  Pt demo good understanding of the education provided. Kiel demonstrated good return demonstration of activities.     Assessment:   Pt was unable to perform HR with R foot completely WB 2* R ankle weakness and instability, PTA placed 1/2 roll under L foot to provide unstable, higher surface to challenge R foot without complete WB on R foot. Pt had difficulty with retro walking on treadmill against resistance exhibiting excessive trunk extension (in compensation of R hip flexors) to " advance R LE, compensating for R LE weakness. Pt requires TC for correct technique during therex. Balance deficits with all single R LE WB activity.    Pt will continue to benefit from skilled PT intervention. Medical Necessity is demonstrated by:  Continued inability to participate in vocational pursuits, Pain limits function of effected part for some activities, Requires skilled supervision to complete and progress HEP and Weakness.    Patient is making good progress towards established goals.      Plan:  Continue with established Plan of Care towards PT goals.     Therapy was performed with SPTA present for entire treatment session supervised by Licensed PTA.

## 2018-07-12 ENCOUNTER — TELEPHONE (OUTPATIENT)
Dept: ORTHOPEDICS | Facility: CLINIC | Age: 43
End: 2018-07-12

## 2018-07-12 ENCOUNTER — CLINICAL SUPPORT (OUTPATIENT)
Dept: REHABILITATION | Facility: HOSPITAL | Age: 43
End: 2018-07-12
Attending: ORTHOPAEDIC SURGERY
Payer: OTHER MISCELLANEOUS

## 2018-07-12 DIAGNOSIS — S92.001D CLOSED FRACTURE OF BOTH CALCANEI WITH ROUTINE HEALING, SUBSEQUENT ENCOUNTER: ICD-10-CM

## 2018-07-12 DIAGNOSIS — S92.002D CLOSED FRACTURE OF BOTH CALCANEI WITH ROUTINE HEALING, SUBSEQUENT ENCOUNTER: ICD-10-CM

## 2018-07-12 PROCEDURE — 97110 THERAPEUTIC EXERCISES: CPT | Mod: PN | Performed by: PHYSICAL THERAPIST

## 2018-07-12 NOTE — TELEPHONE ENCOUNTER
----- Message from Nina Mead sent at 7/12/2018  9:58 AM CDT -----  Good Morning,    Patient has been approved by work comp for inserts referral.  Please provide facility name and contact that will provide patient's inserts.  I have left a message for patient but he has not responded to my call as of today.    Thanks  Brit  PSWC

## 2018-07-12 NOTE — PROGRESS NOTES
Name: Kiel GARCIA [facility] Number: [id number]  Date of Treatment: 07/12/2018   Diagnosis:   Encounter Diagnosis   Name Primary?    Closed fracture of both calcanei with routine healing, subsequent encounter        Time in: 1145  Time Out: 1245  Total Treatment Time: 0  Group Time: 0      Subjective:    Kiel reports decreased pain.  Patient reports their pain to be 3/10 on a 0-10 scale with 0 being no pain and 10 being the worst pain imaginable.    Objective    Patient received individual therapy to increase strength, endurance, ROM and flexibility with 0 patients with activities as follows:     Kiel received therapeutic exercises to develop strength, endurance, ROM and flexibility for 60 minutes including:     EFX 5/5  RM retro: .9mph x 5 min  Standing gastroc stretch 3 x 30 sec B  Soleus stretch 3 x 30 sec B  SLS on ariex 3 x 30 sec B  HR/TR on airex x 30 B  Mini squats 3 x 10  LSU 3 x 10  Shuttle leg press 3 x 10 62#  Shuttle calf press 3 x 10 62#  Shuttle single leg press 3 x 10 37#  BOSU (flat) DF/PF and lateral  Rocker board DF/PF 3 x 10      Assessment:       Pt will continue to benefit from skilled PT intervention. Medical Necessity is demonstrated by:  Unable to participate fully in daily activities and Requires skilled supervision to complete and progress HEP.    Patient is making fair progress towards established goals.    New/Revised goals: na      Plan:  Continue with established Plan of Care towards PT goals.

## 2018-07-13 ENCOUNTER — TELEPHONE (OUTPATIENT)
Dept: ORTHOPEDICS | Facility: CLINIC | Age: 43
End: 2018-07-13

## 2018-07-13 NOTE — TELEPHONE ENCOUNTER
----- Message from Nina Mead sent at 7/13/2018  3:18 PM CDT -----  Good Afternoon,  Kina @ [facility] is requesting peer to peer on therapy referral.  Please contact UR dept @ [phone] ext [phone].    Thanks  Brit  PSWC

## 2018-07-16 ENCOUNTER — TELEPHONE (OUTPATIENT)
Dept: ORTHOPEDICS | Facility: CLINIC | Age: 43
End: 2018-07-16

## 2018-07-16 NOTE — TELEPHONE ENCOUNTER
Patient has been previously denied for pt at the beginning of July. Spoke to Pauline with [facility] to advise that patient has been denied after a peer to peer was completed and if no new information is available, it will not change the outcome. Lm for patient advising that workers comp denied the request for additional PT. Patient to call back with any further questions.

## 2018-07-16 NOTE — TELEPHONE ENCOUNTER
----- Message from Nina Mead sent at 7/16/2018 11:45 AM CDT -----  Good Morning,      Please have someone contact  for therapy peer to peer [phone] due before 7/17/18 5 PM central time      Thanks  Brit  PSWC

## 2018-07-17 ENCOUNTER — TELEPHONE (OUTPATIENT)
Dept: ORTHOPEDICS | Facility: CLINIC | Age: 43
End: 2018-07-17

## 2018-07-17 NOTE — TELEPHONE ENCOUNTER
----- Message from Amada Reed sent at 7/17/2018  2:24 PM CDT -----  Contact: Jovanni //  // [phone]  Jovanni with Dr. Parrish's office left a voice mail message today at 12:00 needing to speak with Dr. Washburn for a peer to peer for physical therapy for her right ankle.  Please call before 5:00 pm today.

## 2018-08-07 NOTE — PROGRESS NOTES
REHAB SERVICES OUTPATIENT DISCHARGE SUMMARY  Physical Therapy      Name:  Kiel Ayala  Date:  8/7/2018  Date of Evaluation:  5/4/2018  Physician:  Omi Tabor MD  Total # Of Visits:  15    Diagnosis:    1. Closed fracture of both calcanei with routine healing, subsequent encounter         Physical/Functional Status:  At time of discharge, patient was able to independently perform his/her written HEP.    The patient is to be discharged from our Therapy service for the following reason(s):  Other:  Authorization for PT declined by insurance    Degree of Goal Achievement:  Patient has not met goals secondary to:  Denial of PT    Patient Education:  independently perform his/her written HEP.    Discharge Plan:  Home Program:

## 2018-09-19 DIAGNOSIS — M79.671 RIGHT FOOT PAIN: Primary | ICD-10-CM

## 2018-09-20 ENCOUNTER — HOSPITAL ENCOUNTER (OUTPATIENT)
Dept: RADIOLOGY | Facility: HOSPITAL | Age: 43
Discharge: HOME | End: 2018-09-20
Attending: ORTHOPAEDIC SURGERY
Payer: OTHER MISCELLANEOUS

## 2018-09-20 ENCOUNTER — OFFICE VISIT (OUTPATIENT)
Dept: ORTHOPEDICS | Facility: CLINIC | Age: 43
End: 2018-09-20
Payer: OTHER MISCELLANEOUS

## 2018-09-20 VITALS
WEIGHT: 160 LBS | HEART RATE: 101 BPM | SYSTOLIC BLOOD PRESSURE: 101 MMHG | DIASTOLIC BLOOD PRESSURE: 74 MMHG | HEIGHT: 65 IN | BODY MASS INDEX: 26.66 KG/M2

## 2018-09-20 DIAGNOSIS — M79.671 RIGHT FOOT PAIN: ICD-10-CM

## 2018-09-20 DIAGNOSIS — G57.81 SURAL NEUROPATHY, RIGHT: ICD-10-CM

## 2018-09-20 DIAGNOSIS — M19.071 OSTEOARTHRITIS OF RIGHT SUBTALAR JOINT: Primary | ICD-10-CM

## 2018-09-20 PROCEDURE — 99999 PR PBB SHADOW E&M-EST. PATIENT-LVL III: CPT | Mod: PBBFAC,,, | Performed by: ORTHOPAEDIC SURGERY

## 2018-09-20 PROCEDURE — 99214 OFFICE O/P EST MOD 30 MIN: CPT | Mod: S$GLB,,, | Performed by: ORTHOPAEDIC SURGERY

## 2018-09-20 PROCEDURE — 73650 X-RAY EXAM OF HEEL: CPT | Mod: TC,PO,RT

## 2018-09-20 PROCEDURE — 73650 X-RAY EXAM OF HEEL: CPT | Mod: 26,RT,, | Performed by: RADIOLOGY

## 2018-09-20 NOTE — PROGRESS NOTES
Subjective:      Patient ID: Kiel Ayala is a 43 y.o. male.    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  He rates his pain as 3/10 today.  He has been doing well with PT. He says he is improving but still has pain with extended walking. He has not gotten the orthotics yet and is awaiting approval from worker's comp on orthopaedic shoes for the orthotics.      Social History     Occupational History    Not on file   Tobacco Use    Smoking status: Never Smoker    Smokeless tobacco: Never Used   Substance and Sexual Activity    Alcohol use: Yes     Comment: rarely/ twice a year    Drug use: No    Sexual activity: Not on file          Objective:    Ortho Exam   RLE: Neurovascularly intact, numbness in sural nerve distribution as expected.  Incision well healed, minimal swelling.  Tenderness to palpation lateral foot in area of the incision but improved since last visit.  Improving range of motion of the ankle. He still has some diminished  plantar flexion.     XRAYS: 2 views of right calcaneus obtained and reviewed today reveal good alignment, hardware is intact.    Fusion appears to be well healed.  Assessment:         s/p hwr, sural nerve neurolysis and subtalar fusion    Plan:     We discussed that I think he will be at his MMI once he obtains his shoes and orthotics. I ordered an FCE to determine works status. I do not think he will be able to return to his previous level of duties for work and would best be served in a sedentary position.   We also discussed that he may require subtalar fusion on the left side in the future but it could be many years from now.    He does not need to f/u after his FCE as I will review if with his .   He can f/u with me prn.

## 2018-11-15 ENCOUNTER — OFFICE VISIT (OUTPATIENT)
Dept: ORTHOPEDICS | Facility: CLINIC | Age: 43
End: 2018-11-15
Payer: OTHER MISCELLANEOUS

## 2018-11-15 VITALS — WEIGHT: 160 LBS | BODY MASS INDEX: 26.66 KG/M2 | HEIGHT: 65 IN

## 2018-11-15 DIAGNOSIS — S92.001D CLOSED FRACTURE OF BOTH CALCANEI WITH ROUTINE HEALING, SUBSEQUENT ENCOUNTER: ICD-10-CM

## 2018-11-15 DIAGNOSIS — M19.071 OSTEOARTHRITIS OF RIGHT SUBTALAR JOINT: Primary | ICD-10-CM

## 2018-11-15 DIAGNOSIS — S92.002D CLOSED FRACTURE OF BOTH CALCANEI WITH ROUTINE HEALING, SUBSEQUENT ENCOUNTER: ICD-10-CM

## 2018-11-15 PROCEDURE — 99999 PR PBB SHADOW E&M-EST. PATIENT-LVL II: CPT | Mod: PBBFAC,,, | Performed by: ORTHOPAEDIC SURGERY

## 2018-11-15 PROCEDURE — 99499 UNLISTED E&M SERVICE: CPT | Mod: S$GLB,,, | Performed by: ORTHOPAEDIC SURGERY

## 2018-11-15 NOTE — PROGRESS NOTES
The patient is here with his  and  for worker's compensation for a rehab conference.  He has reached his MMI. I have reviewed his FCE and agree with the findings.   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   He can follow up with me for the left foot in future as due to the severity of this fracture it is more probable than not that he will require left subtalar fusion. It could be 5 years from now or 10 or even 20 years.  He is discharged from my care for the right foot.

## 2019-01-07 NOTE — TELEPHONE ENCOUNTER
Spoke to patient. Patient stated that his pain is not controlled with the prescribed pain medication. Pain is 10/10. Patient stated that he is keeping bilateral feet elevated above heart level, taking Demerol q4 hours as ordered. Taking phenergan every 4 hours as ordered. Advised patient that if his pain is not controlled by pain medication that was prescribed he should proceed to nearest ER. Patient stated understanding.   Immunohistochemistry ([id number]) billing is not performed here. Please use the Immunohistochemistry Stain Billing plan to accomplish this. Immunohistochemistry ([id number] and [id number]) billing is not performed here. Please use the Immunohistochemistry Stain Billing plan to accomplish this.

## 2021-01-20 ENCOUNTER — CLINICAL SUPPORT (OUTPATIENT)
Dept: URGENT CARE | Facility: CLINIC | Age: 46
End: 2021-01-20

## 2021-01-20 PROCEDURE — 99499 PR PHYSICAL - DOT/CDL: ICD-10-PCS | Mod: S$GLB,,, | Performed by: EMERGENCY MEDICINE

## 2021-01-20 PROCEDURE — 99499 UNLISTED E&M SERVICE: CPT | Mod: S$GLB,,, | Performed by: EMERGENCY MEDICINE

## 2022-07-24 ENCOUNTER — OFFICE VISIT (OUTPATIENT)
Dept: URGENT CARE | Facility: CLINIC | Age: 47
End: 2022-07-24
Payer: MEDICAID

## 2022-07-24 VITALS
RESPIRATION RATE: 16 BRPM | BODY MASS INDEX: 24.99 KG/M2 | DIASTOLIC BLOOD PRESSURE: 71 MMHG | WEIGHT: 150 LBS | TEMPERATURE: 97 F | OXYGEN SATURATION: 97 % | HEIGHT: 65 IN | HEART RATE: 74 BPM | SYSTOLIC BLOOD PRESSURE: 106 MMHG

## 2022-07-24 DIAGNOSIS — R21 RASH: Primary | ICD-10-CM

## 2022-07-24 PROCEDURE — 3008F PR BODY MASS INDEX (BMI) DOCUMENTED: ICD-10-PCS | Mod: CPTII,S$GLB,, | Performed by: NURSE PRACTITIONER

## 2022-07-24 PROCEDURE — 1159F PR MEDICATION LIST DOCUMENTED IN MEDICAL RECORD: ICD-10-PCS | Mod: CPTII,S$GLB,, | Performed by: NURSE PRACTITIONER

## 2022-07-24 PROCEDURE — 3008F BODY MASS INDEX DOCD: CPT | Mod: CPTII,S$GLB,, | Performed by: NURSE PRACTITIONER

## 2022-07-24 PROCEDURE — 3074F SYST BP LT 130 MM HG: CPT | Mod: CPTII,S$GLB,, | Performed by: NURSE PRACTITIONER

## 2022-07-24 PROCEDURE — 3078F DIAST BP <80 MM HG: CPT | Mod: CPTII,S$GLB,, | Performed by: NURSE PRACTITIONER

## 2022-07-24 PROCEDURE — 99213 PR OFFICE/OUTPT VISIT, EST, LEVL III, 20-29 MIN: ICD-10-PCS | Mod: S$GLB,,, | Performed by: NURSE PRACTITIONER

## 2022-07-24 PROCEDURE — 1160F PR REVIEW ALL MEDS BY PRESCRIBER/CLIN PHARMACIST DOCUMENTED: ICD-10-PCS | Mod: CPTII,S$GLB,, | Performed by: NURSE PRACTITIONER

## 2022-07-24 PROCEDURE — 3074F PR MOST RECENT SYSTOLIC BLOOD PRESSURE < 130 MM HG: ICD-10-PCS | Mod: CPTII,S$GLB,, | Performed by: NURSE PRACTITIONER

## 2022-07-24 PROCEDURE — 3078F PR MOST RECENT DIASTOLIC BLOOD PRESSURE < 80 MM HG: ICD-10-PCS | Mod: CPTII,S$GLB,, | Performed by: NURSE PRACTITIONER

## 2022-07-24 PROCEDURE — 99213 OFFICE O/P EST LOW 20 MIN: CPT | Mod: S$GLB,,, | Performed by: NURSE PRACTITIONER

## 2022-07-24 PROCEDURE — 1159F MED LIST DOCD IN RCRD: CPT | Mod: CPTII,S$GLB,, | Performed by: NURSE PRACTITIONER

## 2022-07-24 PROCEDURE — 1160F RVW MEDS BY RX/DR IN RCRD: CPT | Mod: CPTII,S$GLB,, | Performed by: NURSE PRACTITIONER

## 2022-07-24 RX ORDER — GABAPENTIN 400 MG/1
400 CAPSULE ORAL 3 TIMES DAILY
COMMUNITY

## 2022-07-24 NOTE — PATIENT INSTRUCTIONS
Referral was placed to both primary care and Dermatology see the [facility] system.  Hopefully someone will be calling to schedule appointments with you however with your Medicaid plan you may have to look on your Medicaid plan web site to find provider availability based on your plan and call make appointments for both family medicine/primary care and Dermatology for further evaluation of rash of unknown origin.

## 2022-07-24 NOTE — PROGRESS NOTES
"Subjective:       Patient ID: Kiel Ayala is a 46 y.o. male.    Vitals:  height is 5' 5" (1.651 m) and weight is 68 kg (150 lb). His oral temperature is 97.2 °F (36.2 °C). His blood pressure is 106/71 and his pulse is 74. His respiration is 16 and oxygen saturation is 97%.     Chief Complaint: Rash    Patient states he has a rash on both of his side and both of his legs, denies any blisters but is red in color and slight itchiness. X 2 weeks. Denies any new laundry detergent.     Denies new medications, denies recent travel, denies recent illness, denies easy bruising or bleeding.  States that he does have mild gum bleeding when he brushes his teeth but this is not new or different for years.  Denies injury or trauma, denies new products or any known precipitating events, occurrences, exposures.      Constitution: Negative for chills, fatigue and fever.   HENT: Negative for congestion and sore throat.    Neck: Negative for painful lymph nodes.   Cardiovascular: Negative for leg swelling.   Respiratory: Negative for chest tightness, cough and shortness of breath.    Gastrointestinal: Negative for abdominal pain, nausea, vomiting and diarrhea.   Genitourinary: Negative for genital sore.   Musculoskeletal: Negative for muscle cramps and muscle ache.   Skin: Positive for color change and rash. Negative for skin thickening/induration, erythema, bruising, abscess and hives.   Allergic/Immunologic: Positive for itching (Mild itching intermittent). Negative for eczema and hives.   Neurological: Negative for headaches.   Hematologic/Lymphatic: Negative for swollen lymph nodes and easy bruising/bleeding. Does not bruise/bleed easily.       Objective:      Physical Exam   Constitutional: He is oriented to person, place, and time. He appears well-developed.  Non-toxic appearance. He does not appear ill. No distress.   HENT:   Head: Normocephalic and atraumatic.   Nose: Nose normal.   Mouth/Throat: Oropharynx is clear and " moist. Mucous membranes are moist.   Eyes: Conjunctivae and EOM are normal. Right eye exhibits no discharge. Left eye exhibits no discharge.   Cardiovascular: Normal rate.   Pulmonary/Chest: Effort normal. No respiratory distress.   Abdominal: Normal appearance.   Musculoskeletal: Normal range of motion.         General: No swelling, tenderness, deformity or signs of injury. Normal range of motion.      Right lower leg: No edema.      Left lower leg: No edema.   Neurological: no focal deficit. He is alert and oriented to person, place, and time.   Skin: Skin is warm, dry, intact, not diaphoretic, rash, not urticarial, not purpuric, macular, not vesicular and not papular. Capillary refill takes 2 to 3 seconds. No bruising, No erythema and No lesion   Psychiatric: His behavior is normal. Mood normal.   Nursing note and vitals reviewed.                           Assessment:       1. Rash          Plan:         Rash  -     Ambulatory referral/consult to Family Practice  -     Ambulatory referral/consult to Dermatology    Referral was placed to both primary care and Dermatology see the [facility] system.  Hopefully someone will be calling to schedule appointments with you however with your Medicaid plan you may have to look on your Medicaid plan web site to find provider availability based on your plan and call make appointments for both family medicine/primary care and Dermatology for further evaluation of rash of unknown origin.

## 2023-08-10 DIAGNOSIS — F90.2 ATTENTION DEFICIT HYPERACTIVITY DISORDER, COMBINED TYPE: ICD-10-CM

## 2023-08-10 DIAGNOSIS — F40.10 SOCIAL PHOBIA: Primary | ICD-10-CM

## 2023-10-26 NOTE — TELEPHONE ENCOUNTER
----- Message from Lauren Robles sent at 3/23/2017  9:31 AM CDT -----  Patient requesting a refill of pain medication and Flexeril called into [facility] pharmacy. Please order and call patient back at [phone] to confirm. Thanks!   Rx pended and routed

## (undated) DEVICE — BIT DRILL CANN 4.9MM LG AO

## (undated) DEVICE — BLADE SAW MED NAR 18.0X5.5MM

## (undated) DEVICE — SEE MEDLINE ITEM 153688

## (undated) DEVICE — ELECTRODE REM PLYHSV RETURN 9

## (undated) DEVICE — COUNTERSINK CANN 7MM LG AO

## (undated) DEVICE — UNDERGLOVE BIOGEL PI SZ 6.5 LF

## (undated) DEVICE — WIRE-K 20MM X 150MM.
Type: IMPLANTABLE DEVICE | Site: FOOT | Status: NON-FUNCTIONAL
Removed: 2017-11-20

## (undated) DEVICE — SPLINT PLASTER FS 5IN X 30IN

## (undated) DEVICE — KIT EVACUATOR 3-SPRING 1/8 DRN

## (undated) DEVICE — BANDAGE ESMARK 6X12

## (undated) DEVICE — SUT MONOCRYL 3-0 SH U/D

## (undated) DEVICE — DURAPREP SURG SCRUB 26ML

## (undated) DEVICE — SEE MEDLINE ITEM 146313

## (undated) DEVICE — GLOVE SURGEONS ULTRA TOUCH 6.5

## (undated) DEVICE — SEE MEDLINE ITEM 146298

## (undated) DEVICE — Device

## (undated) DEVICE — SUT ETHILON 3-0 PS2 18 BLK

## (undated) DEVICE — GUIDEWIRE CALIB QR 3.2X230MM
Type: IMPLANTABLE DEVICE | Site: FOOT | Status: NON-FUNCTIONAL
Removed: 2017-11-20

## (undated) DEVICE — BURR OVAL DMND 4.0MM LONG.

## (undated) DEVICE — TOURNIQUET SB QC DP 24X4IN